# Patient Record
Sex: MALE | Race: BLACK OR AFRICAN AMERICAN | URBAN - METROPOLITAN AREA
[De-identification: names, ages, dates, MRNs, and addresses within clinical notes are randomized per-mention and may not be internally consistent; named-entity substitution may affect disease eponyms.]

---

## 2019-04-30 ENCOUNTER — OFFICE VISIT (OUTPATIENT)
Dept: FAMILY MEDICINE CLINIC | Age: 69
End: 2019-04-30

## 2019-04-30 ENCOUNTER — HOSPITAL ENCOUNTER (OUTPATIENT)
Dept: LAB | Age: 69
Discharge: HOME | End: 2019-04-30
Payer: COMMERCIAL

## 2019-04-30 VITALS
HEART RATE: 60 BPM | DIASTOLIC BLOOD PRESSURE: 80 MMHG | HEIGHT: 70 IN | OXYGEN SATURATION: 99 % | RESPIRATION RATE: 16 BRPM | SYSTOLIC BLOOD PRESSURE: 120 MMHG | BODY MASS INDEX: 29.35 KG/M2 | WEIGHT: 205 LBS | TEMPERATURE: 98.7 F

## 2019-04-30 DIAGNOSIS — I10 ESSENTIAL HYPERTENSION: Primary | ICD-10-CM

## 2019-04-30 DIAGNOSIS — Z12.5 PROSTATE CANCER SCREENING: ICD-10-CM

## 2019-04-30 DIAGNOSIS — E55.9 HYPOVITAMINOSIS D: ICD-10-CM

## 2019-04-30 DIAGNOSIS — E78.49 OTHER HYPERLIPIDEMIA: ICD-10-CM

## 2019-04-30 DIAGNOSIS — K42.9 UMBILICAL HERNIA WITHOUT OBSTRUCTION AND WITHOUT GANGRENE: ICD-10-CM

## 2019-04-30 DIAGNOSIS — R20.2 PARESTHESIA OF RIGHT ARM: ICD-10-CM

## 2019-04-30 LAB
ALBUMIN SERPL-MCNC: 4.5 G/DL (ref 3.4–5)
ALBUMIN/GLOB SERPL: 1.4 {RATIO} (ref 0.8–1.7)
ALP SERPL-CCNC: 52 U/L (ref 45–117)
ALT SERPL-CCNC: 31 U/L (ref 16–61)
ANION GAP SERPL CALC-SCNC: 5 MMOL/L (ref 3–18)
APPEARANCE UR: CLEAR
AST SERPL-CCNC: 22 U/L (ref 15–37)
BASOPHILS # BLD: 0.1 K/UL (ref 0–0.1)
BASOPHILS NFR BLD: 1 % (ref 0–2)
BILIRUB DIRECT SERPL-MCNC: 0.2 MG/DL (ref 0–0.2)
BILIRUB SERPL-MCNC: 0.6 MG/DL (ref 0.2–1)
BILIRUB UR QL: NEGATIVE
BUN SERPL-MCNC: 12 MG/DL (ref 7–18)
BUN/CREAT SERPL: 10 (ref 12–20)
CALCIUM SERPL-MCNC: 9.1 MG/DL (ref 8.5–10.1)
CHLORIDE SERPL-SCNC: 107 MMOL/L (ref 100–108)
CHOLEST SERPL-MCNC: 146 MG/DL
CO2 SERPL-SCNC: 29 MMOL/L (ref 21–32)
COLOR UR: YELLOW
CREAT SERPL-MCNC: 1.21 MG/DL (ref 0.6–1.3)
DIFFERENTIAL METHOD BLD: ABNORMAL
EOSINOPHIL # BLD: 0.1 K/UL (ref 0–0.4)
EOSINOPHIL NFR BLD: 2 % (ref 0–5)
ERYTHROCYTE [DISTWIDTH] IN BLOOD BY AUTOMATED COUNT: 14.8 % (ref 11.6–14.5)
GLOBULIN SER CALC-MCNC: 3.2 G/DL (ref 2–4)
GLUCOSE SERPL-MCNC: 84 MG/DL (ref 74–99)
GLUCOSE UR STRIP.AUTO-MCNC: NEGATIVE MG/DL
HCT VFR BLD AUTO: 44.5 % (ref 36–48)
HDLC SERPL-MCNC: 51 MG/DL (ref 40–60)
HDLC SERPL: 2.9 {RATIO} (ref 0–5)
HGB BLD-MCNC: 14.2 G/DL (ref 13–16)
HGB UR QL STRIP: NEGATIVE
KETONES UR QL STRIP.AUTO: NEGATIVE MG/DL
LDLC SERPL CALC-MCNC: 76.4 MG/DL (ref 0–100)
LEUKOCYTE ESTERASE UR QL STRIP.AUTO: NEGATIVE
LIPID PROFILE,FLP: NORMAL
LYMPHOCYTES # BLD: 1.7 K/UL (ref 0.9–3.6)
LYMPHOCYTES NFR BLD: 31 % (ref 21–52)
MCH RBC QN AUTO: 28.6 PG (ref 24–34)
MCHC RBC AUTO-ENTMCNC: 31.9 G/DL (ref 31–37)
MCV RBC AUTO: 89.7 FL (ref 74–97)
MONOCYTES # BLD: 0.5 K/UL (ref 0.05–1.2)
MONOCYTES NFR BLD: 10 % (ref 3–10)
NEUTS SEG # BLD: 3 K/UL (ref 1.8–8)
NEUTS SEG NFR BLD: 56 % (ref 40–73)
NITRITE UR QL STRIP.AUTO: NEGATIVE
PH UR STRIP: 5.5 [PH] (ref 5–8)
PLATELET # BLD AUTO: 342 K/UL (ref 135–420)
PMV BLD AUTO: 10.2 FL (ref 9.2–11.8)
POTASSIUM SERPL-SCNC: 4.4 MMOL/L (ref 3.5–5.5)
PROT SERPL-MCNC: 7.7 G/DL (ref 6.4–8.2)
PROT UR STRIP-MCNC: NEGATIVE MG/DL
PSA SERPL-MCNC: 0 NG/ML (ref 0–4)
RBC # BLD AUTO: 4.96 M/UL (ref 4.7–5.5)
SODIUM SERPL-SCNC: 141 MMOL/L (ref 136–145)
SP GR UR REFRACTOMETRY: 1.02 (ref 1–1.03)
T4 FREE SERPL-MCNC: 0.9 NG/DL (ref 0.7–1.5)
TRIGL SERPL-MCNC: 93 MG/DL (ref ?–150)
TSH SERPL DL<=0.05 MIU/L-ACNC: 1.43 UIU/ML (ref 0.36–3.74)
UROBILINOGEN UR QL STRIP.AUTO: 0.2 EU/DL (ref 0.2–1)
VLDLC SERPL CALC-MCNC: 18.6 MG/DL
WBC # BLD AUTO: 5.3 K/UL (ref 4.6–13.2)

## 2019-04-30 PROCEDURE — 80048 BASIC METABOLIC PNL TOTAL CA: CPT

## 2019-04-30 PROCEDURE — 36415 COLL VENOUS BLD VENIPUNCTURE: CPT

## 2019-04-30 PROCEDURE — 80076 HEPATIC FUNCTION PANEL: CPT

## 2019-04-30 PROCEDURE — 84443 ASSAY THYROID STIM HORMONE: CPT

## 2019-04-30 PROCEDURE — 81003 URINALYSIS AUTO W/O SCOPE: CPT

## 2019-04-30 PROCEDURE — 82306 VITAMIN D 25 HYDROXY: CPT

## 2019-04-30 PROCEDURE — 80061 LIPID PANEL: CPT

## 2019-04-30 PROCEDURE — 85025 COMPLETE CBC W/AUTO DIFF WBC: CPT

## 2019-04-30 PROCEDURE — 84153 ASSAY OF PSA TOTAL: CPT

## 2019-04-30 PROCEDURE — 84439 ASSAY OF FREE THYROXINE: CPT

## 2019-04-30 RX ORDER — ATORVASTATIN CALCIUM 20 MG/1
TABLET, FILM COATED ORAL DAILY
COMMUNITY
End: 2019-04-30 | Stop reason: SDUPTHER

## 2019-04-30 RX ORDER — LOSARTAN POTASSIUM 50 MG/1
TABLET ORAL DAILY
COMMUNITY
End: 2019-04-30 | Stop reason: SDUPTHER

## 2019-04-30 RX ORDER — AMLODIPINE BESYLATE 10 MG/1
10 TABLET ORAL DAILY
Qty: 90 TAB | Refills: 1 | Status: SHIPPED | OUTPATIENT
Start: 2019-04-30 | End: 2020-02-21 | Stop reason: SDUPTHER

## 2019-04-30 RX ORDER — LOSARTAN POTASSIUM 100 MG/1
100 TABLET ORAL DAILY
Qty: 90 TAB | Refills: 1 | Status: SHIPPED | OUTPATIENT
Start: 2019-04-30 | End: 2019-10-19 | Stop reason: SDUPTHER

## 2019-04-30 RX ORDER — ATORVASTATIN CALCIUM 20 MG/1
20 TABLET, FILM COATED ORAL DAILY
Qty: 90 TAB | Refills: 1 | Status: SHIPPED | OUTPATIENT
Start: 2019-04-30 | End: 2019-10-19 | Stop reason: SDUPTHER

## 2019-04-30 RX ORDER — AMLODIPINE BESYLATE 10 MG/1
20 TABLET ORAL DAILY
COMMUNITY
End: 2019-04-30 | Stop reason: SDUPTHER

## 2019-04-30 NOTE — PROGRESS NOTES
Annamarie Lieberman is a 71 y.o. male (: 1950) presenting to address: Chief Complaint Patient presents with  Complete Physical  
 
 
Vitals:  
 19 0845 BP: 120/80 Pulse: (!) 58 Resp: 16 Temp: 98.7 °F (37.1 °C) TempSrc: Oral  
SpO2: 99% Weight: 205 lb (93 kg) Height: 5' 10\" (1.778 m) PainSc:   0 - No pain Hearing/Vision: No exam data present Learning Assessment:  
 
Learning Assessment 2019 PRIMARY LEARNER Patient HIGHEST LEVEL OF EDUCATION - PRIMARY LEARNER  > 4 YEARS OF COLLEGE  
BARRIERS PRIMARY LEARNER NONE PRIMARY LANGUAGE ENGLISH  
LEARNER PREFERENCE PRIMARY LISTENING  
ANSWERED BY patient RELATIONSHIP SELF Depression Screening:  
 
3 most recent PHQ Screens 2019 Little interest or pleasure in doing things Not at all Feeling down, depressed, irritable, or hopeless Not at all Total Score PHQ 2 0 Fall Risk Assessment:  
 
Fall Risk Assessment, last 12 mths 2019 Able to walk? Yes Fall in past 12 months? No  
 
Abuse Screening:  
 
Abuse Screening Questionnaire 2019 Do you ever feel afraid of your partner? Nereida Pack Are you in a relationship with someone who physically or mentally threatens you? Nereida Pack Is it safe for you to go home? Caryn Pitts Coordination of Care Questionaire: 1. Have you been to the ER, urgent care clinic since your last visit? Hospitalized since your last visit? NO 
 
2. Have you seen or consulted any other health care providers outside of the [address] [place] [address] since your last visit? Include any pap smears or colon screening. NO Advanced Directive: 1. Do you have an Advanced Directive? Yes  
 
2. Would you like information on Advanced Directives?  NO

## 2019-04-30 NOTE — PATIENT INSTRUCTIONS
DASH Diet: Care Instructions Your Care Instructions The DASH diet is an eating plan that can help lower your blood pressure. DASH stands for Dietary Approaches to Stop Hypertension. Hypertension is high blood pressure. The DASH diet focuses on eating foods that are high in calcium, potassium, and magnesium. These nutrients can lower blood pressure. The foods that are highest in these nutrients are fruits, vegetables, low-fat dairy products, nuts, seeds, and legumes. But taking calcium, potassium, and magnesium supplements instead of eating foods that are high in those nutrients does not have the same effect. The DASH diet also includes whole grains, fish, and poultry. The DASH diet is one of several lifestyle changes your doctor may recommend to lower your high blood pressure. Your doctor may also want you to decrease the amount of sodium in your diet. Lowering sodium while following the DASH diet can lower blood pressure even further than just the DASH diet alone. Follow-up care is a key part of your treatment and safety. Be sure to make and go to all appointments, and call your doctor if you are having problems. It's also a good idea to know your test results and keep a list of the medicines you take. How can you care for yourself at home? Following the DASH diet · Eat 4 to 5 servings of fruit each day. A serving is 1 medium-sized piece of fruit, ½ cup chopped or canned fruit, 1/4 cup dried fruit, or 4 ounces (½ cup) of fruit juice. Choose fruit more often than fruit juice. · Eat 4 to 5 servings of vegetables each day. A serving is 1 cup of lettuce or raw leafy vegetables, ½ cup of chopped or cooked vegetables, or 4 ounces (½ cup) of vegetable juice. Choose vegetables more often than vegetable juice. · Get 2 to 3 servings of low-fat and fat-free dairy each day. A serving is 8 ounces of milk, 1 cup of yogurt, or 1 ½ ounces of cheese. · Eat 6 to 8 servings of grains each day. A serving is 1 slice of bread, 1 ounce of dry cereal, or ½ cup of cooked rice, pasta, or cooked cereal. Try to choose whole-grain products as much as possible. · Limit lean meat, poultry, and fish to 2 servings each day. A serving is 3 ounces, about the size of a deck of cards. · Eat 4 to 5 servings of nuts, seeds, and legumes (cooked dried beans, lentils, and split peas) each week. A serving is 1/3 cup of nuts, 2 tablespoons of seeds, or ½ cup of cooked beans or peas. · Limit fats and oils to 2 to 3 servings each day. A serving is 1 teaspoon of vegetable oil or 2 tablespoons of salad dressing. · Limit sweets and added sugars to 5 servings or less a week. A serving is 1 tablespoon jelly or jam, ½ cup sorbet, or 1 cup of lemonade. · Eat less than 2,300 milligrams (mg) of sodium a day. If you limit your sodium to 1,500 mg a day, you can lower your blood pressure even more. Tips for success · Start small. Do not try to make dramatic changes to your diet all at once. You might feel that you are missing out on your favorite foods and then be more likely to not follow the plan. Make small changes, and stick with them. Once those changes become habit, add a few more changes. · Try some of the following: ? Make it a goal to eat a fruit or vegetable at every meal and at snacks. This will make it easy to get the recommended amount of fruits and vegetables each day. ? Try yogurt topped with fruit and nuts for a snack or healthy dessert. ? Add lettuce, tomato, cucumber, and onion to sandwiches. ? Combine a ready-made pizza crust with low-fat mozzarella cheese and lots of vegetable toppings. Try using tomatoes, squash, spinach, broccoli, carrots, cauliflower, and onions. ? Have a variety of cut-up vegetables with a low-fat dip as an appetizer instead of chips and dip. ? Sprinkle sunflower seeds or chopped almonds over salads.  Or try adding chopped walnuts or almonds to cooked vegetables. ? Try some vegetarian meals using beans and peas. Add garbanzo or kidney beans to salads. Make burritos and tacos with mashed calvillo beans or black beans. Where can you learn more? Go to http://kris-nito.info/. Enter H564 in the search box to learn more about \"DASH Diet: Care Instructions. \" Current as of: July 22, 2018 Content Version: 11.9 © 4448-5270 [email or web address]. Care instructions adapted under license by [employer] (which disclaims liability or warranty for this information). If you have questions about a medical condition or this instruction, always ask your healthcare professional. [address] any warranty or liability for your use of this information.

## 2019-04-30 NOTE — PROGRESS NOTES
Assessment/Plan: *Diagnoses and all orders for this visit: 1. Essential hypertension 
-     amLODIPine (NORVASC) 10 mg tablet; Take 1 Tab by mouth daily. -     losartan (COZAAR) 100 mg tablet; Take 1 Tab by mouth daily. 2. Other hyperlipidemia 
-     CBC WITH AUTOMATED DIFF; Future 
-     HEPATIC FUNCTION PANEL; Future -     LIPID PANEL; Future -     METABOLIC PANEL, BASIC; Future 
-     TSH 3RD GENERATION; Future -     T4, FREE; Future -     URINALYSIS W/ RFLX MICROSCOPIC; Future 
-     atorvastatin (LIPITOR) 20 mg tablet; Take 1 Tab by mouth daily. 3. Prostate cancer screening -     PSA SCREENING (SCREENING); Future 4. Hypovitaminosis D 
-     VITAMIN D, 25 HYDROXY; Future 5. Umbilical hernia without obstruction and without gangrene 
-     REFERRAL TO GENERAL SURGERY 6. Paresthesia of right arm Will try Aleve 1 tab BID for 10 - 14 days and monitor arm. If not better, will send him for a neck xray and steroids. Did not want to try this yet. Return for [address] in 3-4 weeks. We do labs today. Will be due for  next time. The plan was discussed with the patient. The patient verbalized understanding and is in agreement with the plan. All medication potential side effects were discussed with the patient. 
 
------------------------------------------------------------------------------------------------------------------- Myron Capps is a 71 y.o. male and presents with Complete Physical; Tingling (x 1 month right arm ); and Hernia (Non Specific) Subjective: 
Pt is here to establish as a new PCP. He is retired. Is having issues with a new hernia. Had a prior abd hernia which was repaired in 2015, at the same time that he had partial colectomy for colon cancer. Denies any pain in abdomen. Has also been monitoring a tingling sensation in the RT arm that he has had a few weeks.   No known hx of any trauma, no neck pain associated with this, no arm pain. Has been working out at [employer] more now, using weights. HTN:  Well controlled, stable. But his prior PCP in [facility] was giving him Amlodipine as 20 mg! Not as per the guidelines, not even an off label recommendation for HTN. HLD:  On Lipitor 20 mg. Had labs about 6 mon ago, as per pt. No records available at this time. ROS: 
Review of Systems - Negative except as above The problem list was updated as a part of today's visit. There is no problem list on file for this patient. The PSH, FH were reviewed. SH: Social History Tobacco Use  Smoking status: Never Smoker  Smokeless tobacco: Never Used Substance Use Topics  Alcohol use: Not Currently  Drug use: Never Medications/Allergies: No current outpatient medications on file prior to visit. No current facility-administered medications on file prior to visit. No Known Allergies Health Maintenance:  
Health Maintenance Topic Date Due  
 Hepatitis C Screening  1950  DTaP/Tdap/Td series (1 - Tdap) 01/24/1971  Shingrix Vaccine Age 50> (1 of 2) 01/24/2000  
 FOBT Q 1 YEAR AGE 50-75  01/24/2000  GLAUCOMA SCREENING Q2Y  01/24/2015  Pneumococcal 65+ years (1 of 2 - PCV13) 01/24/2015  Influenza Age 5 to Adult  08/01/2019 Objective: 
Visit Vitals /80 (BP 1 Location: Left arm, BP Patient Position: Sitting) Pulse 60 Temp 98.7 °F (37.1 °C) (Oral) Resp 16 Ht 5' 10\" (1.778 m) Wt 205 lb (93 kg) SpO2 99% BMI 29.41 kg/m² Nurses notes and VS reviewed. Physical Examination: General appearance - alert, well appearing, and in no distress Chest - clear to auscultation, no wheezes, rales or rhonchi, symmetric air entry Heart - normal rate, regular rhythm, normal S1, S2, no murmurs, rubs, clicks or gallops Abdomen - HERNIA EXAM: umbilical hernia, reducible, nontender Labwork and Ancillary Studies:  CBC w/Diff 
 No results found for: WBC, WBCLT, HGB, HGBP, PLT, HGBEXT, PLTEXT, HGBEXT, PLTEXT Basic Metabolic Profile No results found for: NA, K, CL, CO2, AGAP, GLU, BUN, CREA, BUCR, GFRAA, GFRNA, CA, GFRAA  
  
LFT No results found for: GPT, ALT, SGOT, GGT, GGTP, AP, APIT, APX, CBIL, TBIL, TBILI Cholesterol No results found for: CHOL, CHOLX, CHLST, CHOLV, HDL, LDL, LDLC, DLDLP, TGLX, TRIGL, TRIGP, CHHD, CHHDX

## 2019-05-01 LAB — 25(OH)D3 SERPL-MCNC: 26.8 NG/ML (ref 30–100)

## 2019-05-02 ENCOUNTER — TELEPHONE (OUTPATIENT)
Dept: SURGERY | Age: 69
End: 2019-05-02

## 2019-05-15 ENCOUNTER — OFFICE VISIT (OUTPATIENT)
Dept: SURGERY | Age: 69
End: 2019-05-15

## 2019-05-15 VITALS
BODY MASS INDEX: 29.2 KG/M2 | TEMPERATURE: 95.6 F | WEIGHT: 204 LBS | HEART RATE: 60 BPM | HEIGHT: 70 IN | DIASTOLIC BLOOD PRESSURE: 90 MMHG | SYSTOLIC BLOOD PRESSURE: 139 MMHG | OXYGEN SATURATION: 100 %

## 2019-05-15 DIAGNOSIS — K43.2 INCISIONAL HERNIA, WITHOUT OBSTRUCTION OR GANGRENE: Primary | ICD-10-CM

## 2019-05-15 NOTE — PROGRESS NOTES
General Surgery Consult Edith Paul  Admit date: (Not on file) MRN: [id number]     : 1950     Age: 71 y.o. Attending Physician: Nicolette Arroyo MD [facility] History of Present Illness:  
 
Edith Paul is a 71 y.o. male was referred for evaluation of a large incisional hernia. The patient has stated that he had an umbilical hernia for decades. In  he was diagnosed with colon cancer for which he had a laparoscopic colectomy with the extraction site at the site of the umbilicus. It seems that this made the hernia larger and he stated that they try to close the umbilical hernia during the colectomy but he developed a an incisional hernia. And then the patient developed prostate cancer and he had a robotic prostatectomy with also the extracting site from the site of the hernia which made the hernia even larger. He stating that now the hernia has been large for the last 4 to 5 years. He said that it is protruding most of the time though sometimes when he lay back can go back into the abdomen. He denies any pain or any discomfort or any change in bowel habits. He also denies constipation. The patient has stated that he was not interested in surgery however he said that his wife and daughter are pushing him to have the surgery because of the way the hernia is looking. Today he is here with his wife. There are no active problems to display for this patient. Past Medical History:  
Diagnosis Date  High cholesterol  Hypertension Past Surgical History:  
Procedure Laterality Date  HX COLECTOMY  2015  
 colon cancer  HX HERNIA REPAIR  2015  HX PROSTATECTOMY  2012 Social History Tobacco Use  Smoking status: Never Smoker  Smokeless tobacco: Never Used Substance Use Topics  Alcohol use: Not Currently Social History Tobacco Use Smoking Status Never Smoker Smokeless Tobacco Never Used Family History Problem Relation Age of Onset  Arthritis Mother  Hypertension Father  Alcohol abuse Father  Hypertension Maternal Grandfather  Alcohol abuse Brother  Drug Abuse Brother  No Known Problems Maternal Grandmother  No Known Problems Paternal Grandmother  No Known Problems Paternal Grandfather Current Outpatient Medications Medication Sig  
 amLODIPine (NORVASC) 10 mg tablet Take 1 Tab by mouth daily.  atorvastatin (LIPITOR) 20 mg tablet Take 1 Tab by mouth daily.  losartan (COZAAR) 100 mg tablet Take 1 Tab by mouth daily. No current facility-administered medications for this visit. No Known Allergies Review of Systems: 
Constitutional: negative Eyes: negative Ears, Nose, Mouth, Throat, and Face: negative Respiratory: negative Cardiovascular: negative Gastrointestinal: positive for Abdominal wall bulge Genitourinary:negative Integument/Breast: negative Hematologic/Lymphatic: negative Musculoskeletal:negative Neurological: negative Behavioral/Psychiatric: negative Endocrine: negative Allergic/Immunologic: negative Objective:  
 
Visit Vitals /90 (BP Patient Position: Sitting) Pulse 60 Temp 95.6 °F (35.3 °C) (Oral) Ht 5' 10\" (1.778 m) Wt 204 lb (92.5 kg) SpO2 100% BMI 29.27 kg/m² Physical Exam:   
 
General:  in no apparent distress, alert, oriented times 3 and cooperative Eyes:  conjunctivae and sclerae normal, pupils equal, round, reactive to light Throat & Neck: no erythema or exudates noted and neck supple and symmetrical; no palpable masses Lungs:   clear to auscultation bilaterally Heart:  Regular rate and rhythm Abdomen:   rounded, soft, nontender, nondistended, no masses or organomegaly. There is a very large abdominal wall hernia located in the supraumbilical area at the site of a transverse incision. the hernia sac is more than 50 cm in size but the defect is probably about 5 cm . Extremities: extremities normal, atraumatic, no cyanosis or edema Skin: Normal.  
   
Imaging and Lab Review: CBC:  
Lab Results Component Value Date/Time WBC 5.3 04/30/2019 10:05 AM  
 RBC 4.96 04/30/2019 10:05 AM  
 HGB 14.2 04/30/2019 10:05 AM  
 HCT 44.5 04/30/2019 10:05 AM  
 PLATELET 250 55/20/6171 10:05 AM  
 
BMP:  
Lab Results Component Value Date/Time Glucose 84 04/30/2019 10:05 AM  
 Sodium 141 04/30/2019 10:05 AM  
 Potassium 4.4 04/30/2019 10:05 AM  
 Chloride 107 04/30/2019 10:05 AM  
 CO2 29 04/30/2019 10:05 AM  
 BUN 12 04/30/2019 10:05 AM  
 Creatinine 1.21 04/30/2019 10:05 AM  
 Calcium 9.1 04/30/2019 10:05 AM  
 
CMP: 
Lab Results Component Value Date/Time Glucose 84 04/30/2019 10:05 AM  
 Sodium 141 04/30/2019 10:05 AM  
 Potassium 4.4 04/30/2019 10:05 AM  
 Chloride 107 04/30/2019 10:05 AM  
 CO2 29 04/30/2019 10:05 AM  
 BUN 12 04/30/2019 10:05 AM  
 Creatinine 1.21 04/30/2019 10:05 AM  
 Calcium 9.1 04/30/2019 10:05 AM  
 Anion gap 5 04/30/2019 10:05 AM  
 BUN/Creatinine ratio 10 (L) 04/30/2019 10:05 AM  
 Alk. phosphatase 52 04/30/2019 10:05 AM  
 Protein, total 7.7 04/30/2019 10:05 AM  
 Albumin 4.5 04/30/2019 10:05 AM  
 Globulin 3.2 04/30/2019 10:05 AM  
 A-G Ratio 1.4 04/30/2019 10:05 AM  
 
 
No results found for this or any previous visit (from the past 24 hour(s)). images and reports reviewed Assessment:  
Wade Reynolds is a 71 y.o. male is presenting with a picture of large recurrent incisional hernia. I had a long discussion with the patient and his wife about the risk of surgery including the high risk of recurrence. I also discussed with them the risk of not doing the surgery including the possibility of incarceration, strangulation, enlargement in size over time, and the risk of emergency surgery in the face of strangulation.  I also discussed the use of prosthetic materials (mesh), including the risk of infection. Also discussed the risk of surgery including recurrence and the possible need for reoperation and removal of mesh if used, possibility of postoperative small bowel injury, obstruction or ileus, and the risks of general anesthetic. I explained to the the patient about the robotic hernia repair procedure. After long discussion and thinking the patient decided that he would like to wait on the surgery and follow-up with me in 6 months to 1 year. Plan:  
  
Close observation Follow-up with me as needed Please call me if you have any questions (cell phone: [phone]) Signed By: Bandar Davison MD   
 May 15, 2019

## 2019-05-15 NOTE — PROGRESS NOTES
Edith Paul is a 71 y.o. male (: 1950) presenting to address: Chief Complaint Patient presents with  Umbilical Hernia  
  present approx 5 years; previous repair Medication list and allergies have been reviewed with Edith Paul and updated as of today's date. I have gone over all Medical, Surgical and Social History with Edith Paul and updated/added the information accordingly. Patient reports to clinics with a very visible umbilical hernia that he reports has been present approximately 5 years. He informs me he has a hx with cancer of the prostate and of th colon which was treated at [facility] with Dr. Eduar Trevizo. He denies and N&V, constipation, diarrhea, body ache, fever, or chills. Pt informs me that he has a previous colon resection and umbilical hernia repair in [place], [place] which we have obtained an [facility] for records. Visit Vitals /90 (BP Patient Position: Sitting) Pulse 60 Temp 95.6 °F (35.3 °C) (Oral) Ht 5' 10\" (1.778 m) Wt 92.5 kg (204 lb) SpO2 100% BMI 29.27 kg/m²

## 2019-05-20 ENCOUNTER — DOCUMENTATION ONLY (OUTPATIENT)
Dept: FAMILY MEDICINE CLINIC | Age: 69
End: 2019-05-20

## 2019-05-20 NOTE — PROGRESS NOTES
Called and left message for patient to call office . I was just following up on gastro info  . When patient was seen he couldn't recall who did his colonoscopy in [place] .

## 2019-06-13 ENCOUNTER — OFFICE VISIT (OUTPATIENT)
Dept: FAMILY MEDICINE CLINIC | Age: 69
End: 2019-06-13

## 2019-06-13 VITALS
HEART RATE: 63 BPM | SYSTOLIC BLOOD PRESSURE: 134 MMHG | DIASTOLIC BLOOD PRESSURE: 94 MMHG | OXYGEN SATURATION: 98 % | TEMPERATURE: 98.5 F | WEIGHT: 201 LBS | RESPIRATION RATE: 16 BRPM | BODY MASS INDEX: 28.77 KG/M2 | HEIGHT: 70 IN

## 2019-06-13 DIAGNOSIS — Z00.00 MEDICARE ANNUAL WELLNESS VISIT, SUBSEQUENT: ICD-10-CM

## 2019-06-13 DIAGNOSIS — Z85.038 HISTORY OF COLON CANCER: ICD-10-CM

## 2019-06-13 DIAGNOSIS — Z90.49 HISTORY OF COLECTOMY: ICD-10-CM

## 2019-06-13 DIAGNOSIS — I10 ESSENTIAL HYPERTENSION: Primary | ICD-10-CM

## 2019-06-13 DIAGNOSIS — E78.49 OTHER HYPERLIPIDEMIA: ICD-10-CM

## 2019-06-13 RX ORDER — PILOCARPINE HYDROCHLORIDE 10 MG/ML
1 SOLUTION/ DROPS OPHTHALMIC 2 TIMES DAILY
Refills: 3 | COMMUNITY
Start: 2019-04-04

## 2019-06-13 RX ORDER — LATANOPROST 50 UG/ML
0.01 SOLUTION/ DROPS OPHTHALMIC DAILY
Refills: 3 | COMMUNITY
Start: 2019-05-15

## 2019-06-13 RX ORDER — CHLORTHALIDONE 25 MG/1
25 TABLET ORAL DAILY
Qty: 90 TAB | Refills: 1 | Status: SHIPPED | OUTPATIENT
Start: 2019-06-13 | End: 2019-12-01 | Stop reason: SDUPTHER

## 2019-06-13 RX ORDER — ERGOCALCIFEROL 1.25 MG/1
1000 CAPSULE ORAL DAILY
COMMUNITY
End: 2019-06-13 | Stop reason: CLARIF

## 2019-06-13 RX ORDER — GLUCOSAMINE SULFATE 1500 MG
POWDER IN PACKET (EA) ORAL DAILY
COMMUNITY

## 2019-06-13 NOTE — PROGRESS NOTES
This is the Subsequent Medicare Annual Wellness Exam, performed 12 months or more after the Initial AWV or the last Subsequent AWV    I have reviewed the patient's medical history in detail and updated the computerized patient record. History     Past Medical History:   Diagnosis Date    High cholesterol     Hypertension       Past Surgical History:   Procedure Laterality Date    HX COLECTOMY  2015    colon cancer     HX HERNIA REPAIR  2015    HX PROSTATECTOMY  2012     Current Outpatient Medications   Medication Sig Dispense Refill    latanoprost (XALATAN) 0.005 % ophthalmic solution Administer 0.005 Drops to both eyes daily. 3    pilocarpine (PILOCAR) 1 % ophthalmic solution Apply 1 Drop to eye two (2) times a day. 3    cholecalciferol (VITAMIN D3) 1,000 unit cap Take  by mouth daily.  chlorthalidone (HYGROTEN) 25 mg tablet Take 1 Tab by mouth daily. 90 Tab 1    amLODIPine (NORVASC) 10 mg tablet Take 1 Tab by mouth daily. 90 Tab 1    atorvastatin (LIPITOR) 20 mg tablet Take 1 Tab by mouth daily. 90 Tab 1    losartan (COZAAR) 100 mg tablet Take 1 Tab by mouth daily. 80 Tab 1     No Known Allergies  Family History   Problem Relation Age of Onset    Arthritis Mother     Hypertension Father     Alcohol abuse Father     Hypertension Maternal Grandfather     Alcohol abuse Brother     Drug Abuse Brother     No Known Problems Maternal Grandmother     No Known Problems Paternal Grandmother     No Known Problems Paternal Grandfather      Social History     Tobacco Use    Smoking status: Never Smoker    Smokeless tobacco: Never Used   Substance Use Topics    Alcohol use: Not Currently     There is no problem list on file for this patient.       Depression Risk Factor Screening:     3 most recent PHQ Screens 6/13/2019   Little interest or pleasure in doing things Not at all   Feeling down, depressed, irritable, or hopeless Not at all   Total Score PHQ 2 0     Alcohol Risk Factor Screening: You do not drink alcohol or very rarely. Functional Ability and Level of Safety:   Hearing Loss  Hearing is good. Activities of Daily Living  The home contains: no safety equipment. Patient does total self care    Fall Risk  Fall Risk Assessment, last 12 mths 6/13/2019   Able to walk? Yes   Fall in past 12 months? Yes       Abuse Screen  Patient is not abused    Cognitive Screening   Evaluation of Cognitive Function:  Has your family/caregiver stated any concerns about your memory: no  Normal    Patient Care Team   Patient Care Team:  Norris Rice MD as PCP - General (Internal Medicine)    Assessment/Plan   Education and counseling provided:  Are appropriate based on today's review and evaluation    Diagnoses and all orders for this visit:    1. Medicare annual wellness visit, subsequent    2. History of colon cancer  -     REFERRAL TO GASTROENTEROLOGY    3. History of colectomy  -     REFERRAL TO GASTROENTEROLOGY    4. Essential hypertension    5. Other hyperlipidemia    Other orders  -     chlorthalidone (HYGROTEN) 25 mg tablet; Take 1 Tab by mouth daily.         Health Maintenance Due   Topic Date Due    Hepatitis C Screening  1950    Shingrix Vaccine Age 50> (1 of 2) 01/24/2000    FOBT Q 1 YEAR AGE 50-75  01/24/2000    GLAUCOMA SCREENING Q2Y  01/24/2015    Pneumococcal 65+ years (2 of 2 - PPSV23) 04/03/2018

## 2019-06-13 NOTE — PATIENT INSTRUCTIONS
Medicare Wellness Visit, Male The best way to live healthy is to have a lifestyle where you eat a well-balanced diet, exercise regularly, limit alcohol use, and quit all forms of tobacco/nicotine, if applicable. Regular preventive services are another way to keep healthy. Preventive services (vaccines, screening tests, monitoring & exams) can help personalize your care plan, which helps you manage your own care. Screening tests can find health problems at the earliest stages, when they are easiest to treat. [facility] follows the current, evidence-based guidelines published by the [facility] ([facility]) when recommending preventive services for our patients. Because we follow these guidelines, sometimes recommendations change over time as research supports it. (For example, a prostate screening blood test is no longer routinely recommended for men with no symptoms.) Of course, you and your doctor may decide to screen more often for some diseases, based on your risk and co-morbidities (chronic disease you are already diagnosed with). Preventive services for you include: - Medicare offers their members a free annual wellness visit, which is time for you and your primary care provider to discuss and plan for your preventive service needs. Take advantage of this benefit every year! 
-All adults over age 72 should receive the recommended pneumonia vaccines. Current USPSTF guidelines recommend a series of two vaccines for the best pneumonia protection.  
-All adults should have a flu vaccine yearly and an ECG.  All adults age 61 and older should receive a shingles vaccine once in their lifetime.   
-All adults age 38-68 who are overweight should have a diabetes screening test once every three years.  
-Other screening tests & preventive services for persons with diabetes include: an eye exam to screen for diabetic retinopathy, a kidney function test, a foot exam, and stricter control over your cholesterol.  
-Cardiovascular screening for adults with routine risk involves an electrocardiogram (ECG) at intervals determined by the provider.  
-Colorectal cancer screening should be done for adults age 54-65 with no increased risk factors for colorectal cancer. There are a number of acceptable methods of screening for this type of cancer. Each test has its own benefits and drawbacks. Discuss with your provider what is most appropriate for you during your annual wellness visit. The different tests include: colonoscopy (considered the best screening method), a fecal occult blood test, a fecal DNA test, and sigmoidoscopy. 
-All adults born between [facility] should be screened once for Hepatitis C. 
-An Abdominal Aortic Aneurysm (AAA) Screening is recommended for men age 73-68 who has ever smoked in their lifetime. Here is a list of your current Health Maintenance items (your personalized list of preventive services) with a due date: 
Health Maintenance Due Topic Date Due  
 Hepatitis C Test  1950  DTaP/Tdap/Td  (1 - Tdap) 01/24/1971  Shingles Vaccine (1 of 2) 01/24/2000  Stool testing for trace blood  01/24/2000  Glaucoma Screening   01/24/2015  Pneumococcal Vaccine (1 of 2 - PCV13) 01/24/2015

## 2019-06-16 NOTE — PROGRESS NOTES
Assessment/Plan:    *Diagnoses and all orders for this visit:    1. Essential hypertension    2. History of colon cancer  -     REFERRAL TO GASTROENTEROLOGY    3. History of colectomy  -     REFERRAL TO GASTROENTEROLOGY    4. Medicare annual wellness visit, subsequent    5. Other hyperlipidemia    Other orders  -     chlorthalidone (HYGROTEN) 25 mg tablet; Take 1 Tab by mouth daily. BP not at goal,will add chlorthalidone. Pt to f/u in 3-4 weeks. Will obtain colonoscopy report from prior. Due for repeat now as per pt. The plan was discussed with the patient. The patient verbalized understanding and is in agreement with the plan. All medication potential side effects were discussed with the patient.    -------------------------------------------------------------------------------------------------------------------        Kristian Powell is a 71 y.o. male and presents with Annual Wellness Visit         Subjective:  Pt here for f/u. HTN; we reduced his Norvasc from 20 mg (given by his prior MD) to 10 mg. Needs something more. HLD: well controlled on Lipitor. Vit d low. He has hx of colonic polyps in past, believes he is due for 5 yr repeat now. ROS:  Review of Systems - Negative except as above        The problem list was updated as a part of today's visit. There is no problem list on file for this patient. The PSH, FH were reviewed. SH:  Social History     Tobacco Use    Smoking status: Never Smoker    Smokeless tobacco: Never Used   Substance Use Topics    Alcohol use: Not Currently    Drug use: Never       Medications/Allergies:  Current Outpatient Medications on File Prior to Visit   Medication Sig Dispense Refill    latanoprost (XALATAN) 0.005 % ophthalmic solution Administer 0.005 Drops to both eyes daily. 3    pilocarpine (PILOCAR) 1 % ophthalmic solution Apply 1 Drop to eye two (2) times a day.   3    cholecalciferol (VITAMIN D3) 1,000 unit cap Take  by mouth daily.  amLODIPine (NORVASC) 10 mg tablet Take 1 Tab by mouth daily. 90 Tab 1    atorvastatin (LIPITOR) 20 mg tablet Take 1 Tab by mouth daily. 90 Tab 1    losartan (COZAAR) 100 mg tablet Take 1 Tab by mouth daily. 90 Tab 1     No current facility-administered medications on file prior to visit. No Known Allergies      Health Maintenance:   Health Maintenance   Topic Date Due    Hepatitis C Screening  1950    Shingrix Vaccine Age 50> (1 of 2) 01/24/2000    FOBT Q 1 YEAR AGE 50-75  01/24/2000    GLAUCOMA SCREENING Q2Y  01/24/2015    Pneumococcal 65+ years (2 of 2 - PPSV23) 04/03/2018    Influenza Age 5 to Adult  08/01/2019    DTaP/Tdap/Td series (2 - Td) 03/11/2021       Objective:  Visit Vitals  BP (!) 134/94 (BP 1 Location: Left arm, BP Patient Position: Sitting)   Pulse 63   Temp 98.5 °F (36.9 °C) (Oral)   Resp 16   Ht 5' 10\" (1.778 m)   Wt 201 lb (91.2 kg)   SpO2 98%   BMI 28.84 kg/m²          Nurses notes and VS reviewed.       Physical Examination: General appearance - alert, well appearing, and in no distress  Ears - bilateral TM's and external ear canals normal  Mouth - mucous membranes moist, pharynx normal without lesions  Neck - supple, no significant adenopathy  Chest - clear to auscultation, no wheezes, rales or rhonchi, symmetric air entry  Heart - normal rate and regular rhythm  Abdomen - soft, nontender, nondistended, no masses or organomegaly  Neurological - motor and sensory grossly normal bilaterally  Musculoskeletal - no joint tenderness, deformity or swelling  Extremities - peripheral pulses normal, no pedal edema, no clubbing or cyanosis          Labwork and Ancillary Studies:    CBC w/Diff  Lab Results   Component Value Date/Time    WBC 5.3 04/30/2019 10:05 AM    HGB 14.2 04/30/2019 10:05 AM    PLATELET 271 66/32/0057 10:05 AM         Basic Metabolic Profile  Lab Results   Component Value Date/Time    Sodium 141 04/30/2019 10:05 AM    Potassium 4.4 04/30/2019 10:05 AM    Chloride 107 04/30/2019 10:05 AM    CO2 29 04/30/2019 10:05 AM    Anion gap 5 04/30/2019 10:05 AM    Glucose 84 04/30/2019 10:05 AM    BUN 12 04/30/2019 10:05 AM    Creatinine 1.21 04/30/2019 10:05 AM    BUN/Creatinine ratio 10 (L) 04/30/2019 10:05 AM    GFR est AA >60 04/30/2019 10:05 AM    GFR est non-AA 59 (L) 04/30/2019 10:05 AM    Calcium 9.1 04/30/2019 10:05 AM         LFT  Lab Results   Component Value Date/Time    ALT (SGPT) 31 04/30/2019 10:05 AM    AST (SGOT) 22 04/30/2019 10:05 AM    Alk.  phosphatase 52 04/30/2019 10:05 AM    Bilirubin, direct 0.2 04/30/2019 10:05 AM    Bilirubin, total 0.6 04/30/2019 10:05 AM         Cholesterol  Lab Results   Component Value Date/Time    Cholesterol, total 146 04/30/2019 10:05 AM    HDL Cholesterol 51 04/30/2019 10:05 AM    LDL, calculated 76.4 04/30/2019 10:05 AM    Triglyceride 93 04/30/2019 10:05 AM    CHOL/HDL Ratio 2.9 04/30/2019 10:05 AM

## 2019-08-06 ENCOUNTER — OFFICE VISIT (OUTPATIENT)
Dept: FAMILY MEDICINE CLINIC | Age: 69
End: 2019-08-06

## 2019-08-06 VITALS
BODY MASS INDEX: 28.06 KG/M2 | RESPIRATION RATE: 16 BRPM | SYSTOLIC BLOOD PRESSURE: 118 MMHG | HEART RATE: 75 BPM | OXYGEN SATURATION: 96 % | TEMPERATURE: 97.9 F | HEIGHT: 70 IN | DIASTOLIC BLOOD PRESSURE: 82 MMHG | WEIGHT: 196 LBS

## 2019-08-06 DIAGNOSIS — I10 ESSENTIAL HYPERTENSION: Primary | ICD-10-CM

## 2019-08-06 DIAGNOSIS — E78.49 OTHER HYPERLIPIDEMIA: ICD-10-CM

## 2019-08-06 NOTE — PROGRESS NOTES
Steven Newton is a 71 y.o. male (: 1950) presenting to address:    Chief Complaint   Patient presents with    Hypertension       Vitals:    19 1053   BP: 118/82   Pulse: 75   Resp: 16   Temp: 97.9 °F (36.6 °C)   TempSrc: Oral   SpO2: 96%   Weight: 196 lb (88.9 kg)   Height: 5' 10\" (1.778 m)   PainSc:   0 - No pain       Hearing/Vision:   No exam data present    Learning Assessment:     Learning Assessment 2019   PRIMARY LEARNER Patient   HIGHEST LEVEL OF EDUCATION - PRIMARY LEARNER  > 4 YEARS OF COLLEGE   BARRIERS PRIMARY LEARNER NONE   PRIMARY LANGUAGE ENGLISH   LEARNER PREFERENCE PRIMARY LISTENING   ANSWERED BY patient    RELATIONSHIP SELF     Depression Screening:     3 most recent PHQ Screens 2019   Little interest or pleasure in doing things Not at all   Feeling down, depressed, irritable, or hopeless Not at all   Total Score PHQ 2 0     Fall Risk Assessment:     Fall Risk Assessment, last 12 mths 2019   Able to walk? Yes   Fall in past 12 months? Yes     Abuse Screening:     Abuse Screening Questionnaire 2019   Do you ever feel afraid of your partner? N   Are you in a relationship with someone who physically or mentally threatens you? N   Is it safe for you to go home? Y     Coordination of Care Questionaire:   1. Have you been to the ER, urgent care clinic since your last visit? Hospitalized since your last visit? NO    2. Have you seen or consulted any other health care providers outside of the [address] [place] [address] since your last visit? Include any pap smears or colon screening. NO    Advanced Directive:   1. Do you have an Advanced Directive? NO    2. Would you like information on Advanced Directives?  NO

## 2019-08-06 NOTE — PATIENT INSTRUCTIONS
High Blood Pressure: Care Instructions  Overview    It's normal for blood pressure to go up and down throughout the day. But if it stays up, you have high blood pressure. Another name for high blood pressure is hypertension. Despite what a lot of people think, high blood pressure usually doesn't cause headaches or make you feel dizzy or lightheaded. It usually has no symptoms. But it does increase your risk of stroke, heart attack, and other problems. You and your doctor will talk about your risks of these problems based on your blood pressure. Your doctor will give you a goal for your blood pressure. Your goal will be based on your health and your age. Lifestyle changes, such as eating healthy and being active, are always important to help lower blood pressure. You might also take medicine to reach your blood pressure goal.  Follow-up care is a key part of your treatment and safety. Be sure to make and go to all appointments, and call your doctor if you are having problems. It's also a good idea to know your test results and keep a list of the medicines you take. How can you care for yourself at home? Medical treatment  · If you stop taking your medicine, your blood pressure will go back up. You may take one or more types of medicine to lower your blood pressure. Be safe with medicines. Take your medicine exactly as prescribed. Call your doctor if you think you are having a problem with your medicine. · Talk to your doctor before you start taking aspirin every day. Aspirin can help certain people lower their risk of a heart attack or stroke. But taking aspirin isn't right for everyone, because it can cause serious bleeding. · See your doctor regularly. You may need to see the doctor more often at first or until your blood pressure comes down. · If you are taking blood pressure medicine, talk to your doctor before you take decongestants or anti-inflammatory medicine, such as ibuprofen.  Some of these medicines can raise blood pressure. · Learn how to check your blood pressure at home. Lifestyle changes  · Stay at a healthy weight. This is especially important if you put on weight around the waist. Losing even 10 pounds can help you lower your blood pressure. · If your doctor recommends it, get more exercise. Walking is a good choice. Bit by bit, increase the amount you walk every day. Try for at least 30 minutes on most days of the week. You also may want to swim, bike, or do other activities. · Avoid or limit alcohol. Talk to your doctor about whether you can drink any alcohol. · Try to limit how much sodium you eat to less than 2,300 milligrams (mg) a day. Your doctor may ask you to try to eat less than 1,500 mg a day. · Eat plenty of fruits (such as bananas and oranges), vegetables, legumes, whole grains, and low-fat dairy products. · Lower the amount of saturated fat in your diet. Saturated fat is found in animal products such as milk, cheese, and meat. Limiting these foods may help you lose weight and also lower your risk for heart disease. · Do not smoke. Smoking increases your risk for heart attack and stroke. If you need help quitting, talk to your doctor about stop-smoking programs and medicines. These can increase your chances of quitting for good. When should you call for help? Call 911 anytime you think you may need emergency care. This may mean having symptoms that suggest that your blood pressure is causing a serious heart or blood vessel problem. Your blood pressure may be over 180/120.   For example, call 911 if:    · You have symptoms of a heart attack. These may include:  ? Chest pain or pressure, or a strange feeling in the chest.  ? Sweating. ? Shortness of breath. ? Nausea or vomiting. ? Pain, pressure, or a strange feeling in the back, neck, jaw, or upper belly or in one or both shoulders or arms. ? Lightheadedness or sudden weakness.   ? A fast or irregular heartbeat.     · You have symptoms of a stroke. These may include:  ? Sudden numbness, tingling, weakness, or loss of movement in your face, arm, or leg, especially on only one side of your body. ? Sudden vision changes. ? Sudden trouble speaking. ? Sudden confusion or trouble understanding simple statements. ? Sudden problems with walking or balance. ? A sudden, severe headache that is different from past headaches.     · You have severe back or belly pain.    Do not wait until your blood pressure comes down on its own. Get help right away.   Call your doctor now or seek immediate care if:    · Your blood pressure is much higher than normal (such as 180/120 or higher), but you don't have symptoms.     · You think high blood pressure is causing symptoms, such as:  ? Severe headache.  ? Blurry vision.    Watch closely for changes in your health, and be sure to contact your doctor if:    · Your blood pressure measures higher than your doctor recommends at least 2 times. That means the top number is higher or the bottom number is higher, or both.     · You think you may be having side effects from your blood pressure medicine. Where can you learn more? Go to http://kris-nito.info/. Enter Z031 in the search box to learn more about \"High Blood Pressure: Care Instructions. \"  Current as of: July 22, 2018  Content Version: 12.1  © 2724-5920 [employer]. Care instructions adapted under license by [employer] (which disclaims liability or warranty for this information). If you have questions about a medical condition or this instruction, always ask your healthcare professional. [place] [address] any warranty or liability for your use of this information.

## 2019-08-06 NOTE — PROGRESS NOTES
Assessment/Plan:    *Diagnoses and all orders for this visit:    1. Essential hypertension    2. Other hyperlipidemia      Reviewed his BP log, discussed readings. Advised him to change the timing of meds. Will take chlorthalidone and Norvasc in AM,  Losartan in PM.    Will take meds in early AM then check BP about 9 am.  Will keep a log again. F/u in 4 months for HTN. I spent 15 minutes with patient today and > 50% of the visit was dedicated to discussing and counseling the patient on meds, side effects, schedule of when to take them. The plan was discussed with the patient. The patient verbalized understanding and is in agreement with the plan. All medication potential side effects were discussed with the patient.    -------------------------------------------------------------------------------------------------------------------        Gemma Ricci is a 71 y.o. male and presents with Hypertension         Subjective:  Pt here for f/u. HTN: seems to be doing better since we added chlorthalidone. HLD: at goal on BW. ROS:  Review of Systems - Negative         The problem list was updated as a part of today's visit. There is no problem list on file for this patient. The PSH, FH were reviewed. SH:  Social History     Tobacco Use    Smoking status: Never Smoker    Smokeless tobacco: Never Used   Substance Use Topics    Alcohol use: Not Currently    Drug use: Never       Medications/Allergies:  Current Outpatient Medications on File Prior to Visit   Medication Sig Dispense Refill    latanoprost (XALATAN) 0.005 % ophthalmic solution Administer 0.005 Drops to both eyes daily. 3    pilocarpine (PILOCAR) 1 % ophthalmic solution Apply 1 Drop to eye two (2) times a day. 3    cholecalciferol (VITAMIN D3) 1,000 unit cap Take  by mouth daily.  chlorthalidone (HYGROTEN) 25 mg tablet Take 1 Tab by mouth daily.  90 Tab 1    amLODIPine (NORVASC) 10 mg tablet Take 1 Tab by mouth daily. 90 Tab 1    atorvastatin (LIPITOR) 20 mg tablet Take 1 Tab by mouth daily. 90 Tab 1    losartan (COZAAR) 100 mg tablet Take 1 Tab by mouth daily. 90 Tab 1     No current facility-administered medications on file prior to visit. No Known Allergies      Health Maintenance:   Health Maintenance   Topic Date Due    Hepatitis C Screening  1950    Shingrix Vaccine Age 50> (1 of 2) 01/24/2000    FOBT Q 1 YEAR AGE 50-75  01/24/2000    GLAUCOMA SCREENING Q2Y  01/24/2015    Pneumococcal 65+ years (2 of 2 - PPSV23) 04/03/2018    Influenza Age 5 to Adult  08/01/2019    DTaP/Tdap/Td series (2 - Td) 03/11/2021       Objective:  Visit Vitals  /82 (BP 1 Location: Left arm, BP Patient Position: Sitting)   Pulse 75   Temp 97.9 °F (36.6 °C) (Oral)   Resp 16   Ht 5' 10\" (1.778 m)   Wt 196 lb (88.9 kg)   SpO2 96%   BMI 28.12 kg/m²          Nurses notes and VS reviewed. Physical Examination: General appearance - alert, well appearing, and in no distress        Labwork and Ancillary Studies:    CBC w/Diff  Lab Results   Component Value Date/Time    WBC 5.3 04/30/2019 10:05 AM    HGB 14.2 04/30/2019 10:05 AM    PLATELET 345 21/19/5215 10:05 AM         Basic Metabolic Profile  Lab Results   Component Value Date/Time    Sodium 141 04/30/2019 10:05 AM    Potassium 4.4 04/30/2019 10:05 AM    Chloride 107 04/30/2019 10:05 AM    CO2 29 04/30/2019 10:05 AM    Anion gap 5 04/30/2019 10:05 AM    Glucose 84 04/30/2019 10:05 AM    BUN 12 04/30/2019 10:05 AM    Creatinine 1.21 04/30/2019 10:05 AM    BUN/Creatinine ratio 10 (L) 04/30/2019 10:05 AM    GFR est AA >60 04/30/2019 10:05 AM    GFR est non-AA 59 (L) 04/30/2019 10:05 AM    Calcium 9.1 04/30/2019 10:05 AM         LFT  Lab Results   Component Value Date/Time    ALT (SGPT) 31 04/30/2019 10:05 AM    AST (SGOT) 22 04/30/2019 10:05 AM    Alk.  phosphatase 52 04/30/2019 10:05 AM    Bilirubin, direct 0.2 04/30/2019 10:05 AM    Bilirubin, total 0.6 04/30/2019 10:05 AM         Cholesterol  Lab Results   Component Value Date/Time    Cholesterol, total 146 04/30/2019 10:05 AM    HDL Cholesterol 51 04/30/2019 10:05 AM    LDL, calculated 76.4 04/30/2019 10:05 AM    Triglyceride 93 04/30/2019 10:05 AM    CHOL/HDL Ratio 2.9 04/30/2019 10:05 AM

## 2019-10-19 DIAGNOSIS — I10 ESSENTIAL HYPERTENSION: ICD-10-CM

## 2019-10-19 DIAGNOSIS — E78.49 OTHER HYPERLIPIDEMIA: ICD-10-CM

## 2019-10-20 RX ORDER — ATORVASTATIN CALCIUM 20 MG/1
TABLET, FILM COATED ORAL
Qty: 90 TAB | Refills: 1 | Status: SHIPPED | OUTPATIENT
Start: 2019-10-20 | End: 2020-02-21 | Stop reason: SDUPTHER

## 2019-10-20 RX ORDER — LOSARTAN POTASSIUM 100 MG/1
TABLET ORAL
Qty: 90 TAB | Refills: 1 | Status: SHIPPED | OUTPATIENT
Start: 2019-10-20 | End: 2020-02-21 | Stop reason: SDUPTHER

## 2019-12-02 RX ORDER — CHLORTHALIDONE 25 MG/1
TABLET ORAL
Qty: 90 TAB | Refills: 1 | Status: SHIPPED | OUTPATIENT
Start: 2019-12-02 | End: 2020-02-21 | Stop reason: SDUPTHER

## 2019-12-12 ENCOUNTER — OFFICE VISIT (OUTPATIENT)
Dept: FAMILY MEDICINE CLINIC | Age: 69
End: 2019-12-12

## 2019-12-12 VITALS
OXYGEN SATURATION: 96 % | HEIGHT: 70 IN | RESPIRATION RATE: 16 BRPM | BODY MASS INDEX: 29.2 KG/M2 | HEART RATE: 69 BPM | TEMPERATURE: 97.9 F | DIASTOLIC BLOOD PRESSURE: 75 MMHG | SYSTOLIC BLOOD PRESSURE: 118 MMHG | WEIGHT: 204 LBS

## 2019-12-12 DIAGNOSIS — E78.49 OTHER HYPERLIPIDEMIA: ICD-10-CM

## 2019-12-12 DIAGNOSIS — E55.9 HYPOVITAMINOSIS D: ICD-10-CM

## 2019-12-12 DIAGNOSIS — I10 ESSENTIAL HYPERTENSION: Primary | ICD-10-CM

## 2019-12-12 DIAGNOSIS — Z12.5 PROSTATE CANCER SCREENING: ICD-10-CM

## 2019-12-12 NOTE — PATIENT INSTRUCTIONS
High Blood Pressure: Care Instructions Overview It's normal for blood pressure to go up and down throughout the day. But if it stays up, you have high blood pressure. Another name for high blood pressure is hypertension. Despite what a lot of people think, high blood pressure usually doesn't cause headaches or make you feel dizzy or lightheaded. It usually has no symptoms. But it does increase your risk of stroke, heart attack, and other problems. You and your doctor will talk about your risks of these problems based on your blood pressure. Your doctor will give you a goal for your blood pressure. Your goal will be based on your health and your age. Lifestyle changes, such as eating healthy and being active, are always important to help lower blood pressure. You might also take medicine to reach your blood pressure goal. 
Follow-up care is a key part of your treatment and safety. Be sure to make and go to all appointments, and call your doctor if you are having problems. It's also a good idea to know your test results and keep a list of the medicines you take. How can you care for yourself at home? Medical treatment · If you stop taking your medicine, your blood pressure will go back up. You may take one or more types of medicine to lower your blood pressure. Be safe with medicines. Take your medicine exactly as prescribed. Call your doctor if you think you are having a problem with your medicine. · Talk to your doctor before you start taking aspirin every day. Aspirin can help certain people lower their risk of a heart attack or stroke. But taking aspirin isn't right for everyone, because it can cause serious bleeding. · See your doctor regularly. You may need to see the doctor more often at first or until your blood pressure comes down. · If you are taking blood pressure medicine, talk to your doctor before you take decongestants or anti-inflammatory medicine, such as ibuprofen. Some of these medicines can raise blood pressure. · Learn how to check your blood pressure at home. Lifestyle changes · Stay at a healthy weight. This is especially important if you put on weight around the waist. Losing even 10 pounds can help you lower your blood pressure. · If your doctor recommends it, get more exercise. Walking is a good choice. Bit by bit, increase the amount you walk every day. Try for at least 30 minutes on most days of the week. You also may want to swim, bike, or do other activities. · Avoid or limit alcohol. Talk to your doctor about whether you can drink any alcohol. · Try to limit how much sodium you eat to less than 2,300 milligrams (mg) a day. Your doctor may ask you to try to eat less than 1,500 mg a day. · Eat plenty of fruits (such as bananas and oranges), vegetables, legumes, whole grains, and low-fat dairy products. · Lower the amount of saturated fat in your diet. Saturated fat is found in animal products such as milk, cheese, and meat. Limiting these foods may help you lose weight and also lower your risk for heart disease. · Do not smoke. Smoking increases your risk for heart attack and stroke. If you need help quitting, talk to your doctor about stop-smoking programs and medicines. These can increase your chances of quitting for good. When should you call for help? Call  911 anytime you think you may need emergency care. This may mean having symptoms that suggest that your blood pressure is causing a serious heart or blood vessel problem. Your blood pressure may be over 180/120. 
 For example, call  911 if: 
  · You have symptoms of a heart attack. These may include: 
? Chest pain or pressure, or a strange feeling in the chest. 
? Sweating. ? Shortness of breath. ? Nausea or vomiting. ? Pain, pressure, or a strange feeling in the back, neck, jaw, or upper belly or in one or both shoulders or arms. ? Lightheadedness or sudden weakness. ? A fast or irregular heartbeat.  
  · You have symptoms of a stroke. These may include: 
? Sudden numbness, tingling, weakness, or loss of movement in your face, arm, or leg, especially on only one side of your body. ? Sudden vision changes. ? Sudden trouble speaking. ? Sudden confusion or trouble understanding simple statements. ? Sudden problems with walking or balance. ? A sudden, severe headache that is different from past headaches.  
  · You have severe back or belly pain.  
 Do not wait until your blood pressure comes down on its own. Get help right away. 
 Call your doctor now or seek immediate care if: 
  · Your blood pressure is much higher than normal (such as 180/120 or higher), but you don't have symptoms.  
  · You think high blood pressure is causing symptoms, such as: 
? Severe headache. 
? Blurry vision.  
 Watch closely for changes in your health, and be sure to contact your doctor if: 
  · Your blood pressure measures higher than your doctor recommends at least 2 times. That means the top number is higher or the bottom number is higher, or both.  
  · You think you may be having side effects from your blood pressure medicine. Where can you learn more? Go to http://kris-ntio.info/. Enter N215 in the search box to learn more about \"High Blood Pressure: Care Instructions. \" Current as of: April 9, 2019 Content Version: 12.2 © 3110-4458 [employer]. Care instructions adapted under license by [employer] (which disclaims liability or warranty for this information). If you have questions about a medical condition or this instruction, always ask your healthcare professional. [place] [address] any warranty or liability for your use of this information.

## 2019-12-12 NOTE — PROGRESS NOTES
Assessment/Plan:    *Diagnoses and all orders for this visit:    1. Essential hypertension    Other orders  -     PSA SCREENING (SCREENING); Future  -     CBC WITH AUTOMATED DIFF; Future  -     HEPATIC FUNCTION PANEL; Future  -     LIPID PANEL; Future  -     METABOLIC PANEL, BASIC; Future  -     TSH 3RD GENERATION; Future  -     T4, FREE; Future  -     URINALYSIS W/ RFLX MICROSCOPIC; Future  -     VITAMIN D, 25 HYDROXY; Future        Will return for a physical in June. BW prior. The plan was discussed with the patient. The patient verbalized understanding and is in agreement with the plan. All medication potential side effects were discussed with the patient.    -------------------------------------------------------------------------------------------------------------------        Mitra Knox is a 71 y.o. male and presents with Hypertension         Subjective:  Pt here for f/u of HTN. Has been stable, no changes. BP is doing very well. No new complaints. Review of Systems - ENT ROS: negative  Respiratory ROS: no cough, shortness of breath, or wheezing  Cardiovascular ROS: no chest pain or dyspnea on exertion  Gastrointestinal ROS: no abdominal pain, change in bowel habits, or black or bloody stools  Genito-Urinary ROS: no dysuria, trouble voiding, or hematuria         The problem list was updated as a part of today's visit. There is no problem list on file for this patient. The PSH, FH were reviewed.         SH:  Social History     Tobacco Use    Smoking status: Never Smoker    Smokeless tobacco: Never Used   Substance Use Topics    Alcohol use: Not Currently    Drug use: Never       Medications/Allergies:  Current Outpatient Medications on File Prior to Visit   Medication Sig Dispense Refill    chlorthalidone (HYGROTEN) 25 mg tablet TAKE 1 TABLET BY MOUTH EVERY DAY 90 Tab 1    atorvastatin (LIPITOR) 20 mg tablet TAKE 1 TABLET BY MOUTH EVERY DAY 90 Tab 1    losartan (COZAAR) 100 mg tablet TAKE 1 TABLET BY MOUTH EVERY DAY 90 Tab 1    latanoprost (XALATAN) 0.005 % ophthalmic solution Administer 0.005 Drops to both eyes daily. 3    pilocarpine (PILOCAR) 1 % ophthalmic solution Apply 1 Drop to eye two (2) times a day. 3    cholecalciferol (VITAMIN D3) 1,000 unit cap Take  by mouth daily.  amLODIPine (NORVASC) 10 mg tablet Take 1 Tab by mouth daily. 90 Tab 1     No current facility-administered medications on file prior to visit. No Known Allergies      Health Maintenance:   Health Maintenance   Topic Date Due    Hepatitis C Screening  1950    Shingrix Vaccine Age 50> (1 of 2) 01/24/2000    FOBT Q 1 YEAR AGE 50-75  01/24/2000    Pneumococcal 65+ years (2 of 2 - PPSV23) 04/03/2018    Influenza Age 5 to Adult  08/01/2019    DTaP/Tdap/Td series (2 - Td) 03/11/2021    GLAUCOMA SCREENING Q2Y  05/17/2021       Objective:  Visit Vitals  /75   Pulse 69   Temp 97.9 °F (36.6 °C) (Oral)   Resp 16   Ht 5' 10\" (1.778 m)   Wt 204 lb (92.5 kg)   SpO2 96%   BMI 29.27 kg/m²          Nurses notes and VS reviewed.           Physical Examination: General appearance - alert, well appearing, and in no distress  Chest - clear to auscultation, no wheezes, rales or rhonchi, symmetric air entry  Heart - normal rate, regular rhythm, normal S1, S2, no murmurs, rubs, clicks or gallops      Labwork and Ancillary Studies:    CBC w/Diff  Lab Results   Component Value Date/Time    WBC 5.3 04/30/2019 10:05 AM    HGB 14.2 04/30/2019 10:05 AM    PLATELET 323 44/79/5967 10:05 AM         Basic Metabolic Profile  Lab Results   Component Value Date/Time    Sodium 141 04/30/2019 10:05 AM    Potassium 4.4 04/30/2019 10:05 AM    Chloride 107 04/30/2019 10:05 AM    CO2 29 04/30/2019 10:05 AM    Anion gap 5 04/30/2019 10:05 AM    Glucose 84 04/30/2019 10:05 AM    BUN 12 04/30/2019 10:05 AM    Creatinine 1.21 04/30/2019 10:05 AM    BUN/Creatinine ratio 10 (L) 04/30/2019 10:05 AM    GFR est AA >60 04/30/2019 10:05 AM    GFR est non-AA 59 (L) 04/30/2019 10:05 AM    Calcium 9.1 04/30/2019 10:05 AM         LFT  Lab Results   Component Value Date/Time    ALT (SGPT) 31 04/30/2019 10:05 AM    AST (SGOT) 22 04/30/2019 10:05 AM    Alk.  phosphatase 52 04/30/2019 10:05 AM    Bilirubin, direct 0.2 04/30/2019 10:05 AM    Bilirubin, total 0.6 04/30/2019 10:05 AM         Cholesterol  Lab Results   Component Value Date/Time    Cholesterol, total 146 04/30/2019 10:05 AM    HDL Cholesterol 51 04/30/2019 10:05 AM    LDL, calculated 76.4 04/30/2019 10:05 AM    Triglyceride 93 04/30/2019 10:05 AM    CHOL/HDL Ratio 2.9 04/30/2019 10:05 AM

## 2019-12-12 NOTE — PROGRESS NOTES
Annemarie Correa is a 71 y.o. male (: 1950) presenting to address:    Chief Complaint   Patient presents with    Hypertension       Vitals:    19 1055   BP: 118/75   Pulse: 69   Resp: 16   Temp: 97.9 °F (36.6 °C)   TempSrc: Oral   SpO2: 96%   Weight: 204 lb (92.5 kg)   Height: 5' 10\" (1.778 m)   PainSc:   0 - No pain       Hearing/Vision:   No exam data present    Learning Assessment:     Learning Assessment 2019   PRIMARY LEARNER Patient   HIGHEST LEVEL OF EDUCATION - PRIMARY LEARNER  > 4 YEARS OF COLLEGE   BARRIERS PRIMARY LEARNER NONE   PRIMARY LANGUAGE ENGLISH   LEARNER PREFERENCE PRIMARY LISTENING   ANSWERED BY patient    RELATIONSHIP SELF     Depression Screening:     3 most recent PHQ Screens 2019   Little interest or pleasure in doing things Not at all   Feeling down, depressed, irritable, or hopeless Not at all   Total Score PHQ 2 0     Fall Risk Assessment:     Fall Risk Assessment, last 12 mths 2019   Able to walk? Yes   Fall in past 12 months? Yes     Abuse Screening:     Abuse Screening Questionnaire 2019   Do you ever feel afraid of your partner? N   Are you in a relationship with someone who physically or mentally threatens you? N   Is it safe for you to go home? Y     Coordination of Care Questionaire:   1. Have you been to the ER, urgent care clinic since your last visit? Hospitalized since your last visit? NO    2. Have you seen or consulted any other health care providers outside of the [address] [place] [address] since your last visit? Include any pap smears or colon screening. NO    Advanced Directive:   1. Do you have an Advanced Directive? NO    2. Would you like information on Advanced Directives?  NO

## 2019-12-24 ENCOUNTER — APPOINTMENT (OUTPATIENT)
Dept: CT IMAGING | Age: 69
End: 2019-12-24
Attending: PHYSICIAN ASSISTANT
Payer: MEDICARE

## 2019-12-24 ENCOUNTER — APPOINTMENT (OUTPATIENT)
Dept: GENERAL RADIOLOGY | Age: 69
End: 2019-12-24
Attending: PHYSICIAN ASSISTANT
Payer: MEDICARE

## 2019-12-24 ENCOUNTER — HOSPITAL ENCOUNTER (EMERGENCY)
Age: 69
Discharge: HOME | End: 2019-12-24
Attending: EMERGENCY MEDICINE | Admitting: EMERGENCY MEDICINE
Payer: MEDICARE

## 2019-12-24 VITALS
TEMPERATURE: 98.7 F | SYSTOLIC BLOOD PRESSURE: 149 MMHG | WEIGHT: 203 LBS | HEIGHT: 71 IN | BODY MASS INDEX: 28.42 KG/M2 | RESPIRATION RATE: 16 BRPM | DIASTOLIC BLOOD PRESSURE: 97 MMHG | HEART RATE: 62 BPM | OXYGEN SATURATION: 100 %

## 2019-12-24 DIAGNOSIS — S06.0X0A CONCUSSION WITHOUT LOSS OF CONSCIOUSNESS, INITIAL ENCOUNTER: Primary | ICD-10-CM

## 2019-12-24 DIAGNOSIS — S20.211A CONTUSION OF RIGHT CHEST WALL, INITIAL ENCOUNTER: ICD-10-CM

## 2019-12-24 PROCEDURE — 99281 EMR DPT VST MAYX REQ PHY/QHP: CPT

## 2019-12-24 PROCEDURE — 71046 X-RAY EXAM CHEST 2 VIEWS: CPT

## 2019-12-24 PROCEDURE — 70450 CT HEAD/BRAIN W/O DYE: CPT

## 2019-12-24 NOTE — ED TRIAGE NOTES
Patient states last Thursday he tripped and fell, no LOC, did hit his head (above the right eyebrow) and R ribs (below the breast), states it hurst to take a dep breath.

## 2019-12-24 NOTE — DISCHARGE INSTRUCTIONS
Learning About a Closed Head Injury  What is a closed head injury? A closed head injury happens when your head gets hit hard. The strong force of the blow causes your brain to shake in your skull. This movement can cause the brain to bruise, swell, or tear. Sometimes nerves or blood vessels also get damaged. This can cause bleeding in or around the brain. A concussion is a type of closed head injury. What are the symptoms? If you have a mild concussion, you may have a mild headache or feel \"not quite right. \" These symptoms are common. They usually go away over a few days to 4 weeks. But sometimes after a concussion, you feel like you can't function as well as before the injury. And you have new symptoms. This is called postconcussive syndrome. You may:  · Find it harder to solve problems, think, concentrate, or remember. · Have headaches. · Have changes in your sleep patterns, such as not being able to sleep or sleeping all the time. · Have changes in your personality. · Not be interested in your usual activities. · Feel angry or anxious without a clear reason. · Lose your sense of taste or smell. · Be dizzy, lightheaded, or unsteady. It may be hard to stand or walk. How is a closed head injury treated? Any person who may have a concussion needs to see a doctor. Some people have to stay in the hospital to be watched. Others can go home safely. If you go home, follow your doctor's instructions. He or she will tell you if you need someone to watch you closely for the next 24 hours or longer. Rest is the best treatment. Get plenty of sleep at night. And try to rest during the day. · Avoid activities that are physically or mentally demanding. These include housework, exercise, and schoolwork. And don't play video games, send text messages, or use the computer. You may need to change your school or work schedule to be able to avoid these activities.   · Ask your doctor when it's okay to drive, ride a bike, or operate machinery. · Take an over-the-counter pain medicine, such as acetaminophen (Tylenol), ibuprofen (Advil, Motrin), or naproxen (Aleve). Be safe with medicines. Read and follow all instructions on the label. · Check with your doctor before you use any other medicines for pain. · Do not drink alcohol or use illegal drugs. They can slow recovery. They can also increase your risk of getting a second head injury. Follow-up care is a key part of your treatment and safety. Be sure to make and go to all appointments, and call your doctor if you are having problems. It's also a good idea to know your test results and keep a list of the medicines you take. Where can you learn more? Go to http://kris-nito.info/. Enter E235 in the search box to learn more about \"Learning About a Closed Head Injury. \"  Current as of: March 28, 2019  Content Version: 12.2  © 2162-4810 [employer]. Care instructions adapted under license by [employer] (which disclaims liability or warranty for this information). If you have questions about a medical condition or this instruction, always ask your healthcare professional. [place] [address] any warranty or liability for your use of this information. Patient Education        Chest Contusion: Care Instructions  Your Care Instructions  A chest contusion, or bruise, is caused by a fall or direct blow to the chest. Car crashes, falls, getting punched, and injury from bicycle handlebars are common causes of chest contusions. A very forceful blow to the chest can injure the heart or blood vessels in the chest, the lungs, the airway, the liver, or the spleen. Pain may be caused by an injury to muscles, cartilage, or ribs. Deep breathing, coughing, or sneezing can increase your pain. Lying on the injured area also can cause pain. Follow-up care is a key part of your treatment and safety.  Be sure to make and go to all appointments, and call your doctor if you are having problems. It's also a good idea to know your test results and keep a list of the medicines you take. How can you care for yourself at home? · Rest and protect the injured or sore area. Stop, change, or take a break from any activity that may be causing your pain. · Put ice or a cold pack on the area for 10 to 20 minutes at a time. Put a thin cloth between the ice and your skin. · After 2 or 3 days, if your swelling is gone, apply a heating pad set on low or a warm cloth to your chest. Some doctors suggest that you go back and forth between hot and cold. Put a thin cloth between the heating pad and your skin. · Do not wrap or tape your ribs for support. This may cause you to take smaller breaths, which could increase your risk of pneumonia and lung collapse. · Ask your doctor if you can take an over-the-counter pain medicine, such as acetaminophen (Tylenol), ibuprofen (Advil, Motrin), or naproxen (Aleve). Be safe with medicines. Read and follow all instructions on the label. · Take your medicines exactly as prescribed. Call your doctor if you think you are having a problem with your medicine. · Gentle stretching and massage may help you feel better after a few days of rest. Stretch slowly to the point just before discomfort begins, then hold the stretch for at least 15 to 30 seconds. Do this 3 or 4 times per day. · As your pain gets better, slowly return to your normal activities. Be patient, because chest bruises can take weeks or months to heal. Any increased pain may be a sign that you need to rest a while longer. When should you call for help? Call 911 anytime you think you may need emergency care.  For example, call if:    · You have severe trouble breathing.     · You cough up blood.    Call your doctor now or seek immediate medical care if:    · You have belly pain.     · You are dizzy or lightheaded, or you feel like you may faint.     · You develop new symptoms with the chest pain.     · Your chest pain gets worse.     · You have a fever.     · You have some shortness of breath.     · You have a cough that brings up mucus from the lungs.    Watch closely for changes in your health, and be sure to contact your doctor if:    · Your chest pain is not improving after 1 week. Where can you learn more? Go to http://kris-nito.info/. Enter I174 in the search box to learn more about \"Chest Contusion: Care Instructions. \"  Current as of: June 26, 2019  Content Version: 12.2  © 4445-2304 [employer]. Care instructions adapted under license by [employer] (which disclaims liability or warranty for this information). If you have questions about a medical condition or this instruction, always ask your healthcare professional. [address] any warranty or liability for your use of this information. Patient Education        Bruised Rib: Care Instructions  Overview    You can get a bruised rib if you fall or get hit, such as in an accident or while playing sports. The medical term for a bruise is \"contusion. \" Small blood vessels get torn and leak blood under the skin. Most people think of a bruise as a black-and-blue area. But bones and muscles can also get bruised. An injury may damage the rib but not cause a bruise that you can see. Sometimes it can be hard to tell if a rib is bruised or broken. The symptoms may be the same. And a broken bone can't always be seen on an X-ray. But the treatment for a bruised rib is often the same as treatment for a broken one. An injury to the ribs can cause pain. The pain may be worse when you breathe deeply, cough, or sneeze. In most cases, a bruised rib will heal on its own. You can take pain medicine while the rib mends. Pain relief allows you to take deep breaths. Follow-up care is a key part of your treatment and safety.  Be sure to make and go to all appointments, and call your doctor if you are having problems. It's also a good idea to know your test results and keep a list of the medicines you take. How can you care for yourself at home? · Rest and protect the injured or sore area. Stop, change, or take a break from any activity that causes pain. · Put ice or a cold pack on the area for 10 to 20 minutes at a time. Put a thin cloth between the ice and your skin. · After 2 or 3 days, if your swelling is gone, put a heating pad set on low or a warm cloth on your chest. Some doctors suggest that you go back and forth between hot and cold. Put a thin cloth between the heating pad and your skin. · Ask your doctor if you can take an over-the-counter pain medicine, such as acetaminophen (Tylenol), ibuprofen (Advil, Motrin), or naproxen (Aleve). Be safe with medicines. Read and follow all instructions on the label. · As your pain gets better, slowly return to your normal activities. Be patient. Rib bruises can take weeks or months to heal. If the pain gets worse, it may be a sign that you need to rest a while longer. When should you call for help? Call 911 anytime you think you may need emergency care. For example, call if:    · You have severe trouble breathing.     Call your doctor now or seek immediate medical care if:    · You have trouble breathing.     · You have a fever.     · You have a new or worse cough.     · You have new or worse pain.    Watch closely for changes in your health, and be sure to contact your doctor if:    · You do not get better as expected. Where can you learn more? Go to http://kris-nito.info/. Enter R125 in the search box to learn more about \"Bruised Rib: Care Instructions. \"  Current as of: June 26, 2019  Content Version: 12.2  © 7456-1572 [employer]. Care instructions adapted under license by [employer] (which disclaims liability or warranty for this information).  If you have questions about a medical condition or this instruction, always ask your healthcare professional. [place] [address] any warranty or liability for your use of this information.

## 2019-12-25 NOTE — ED PROVIDER NOTES
EMERGENCY DEPARTMENT HISTORY AND PHYSICAL EXAM    Date: 12/24/2019  Patient Name: Rhianna Vanegas    History of Presenting Illness     Chief Complaint   Patient presents with   [facility] Fall    Rib Pain    Other         History Provided By: patient        Additional History (Context): Rhianna Vanegas is a 60-year-old male presenting to the emergency department with headache, dizziness off and on for the past week. Patient states approximately a week and half ago, he fell hit his head and right side of rib. Patient states he missed a step causing him to trip. There were no symptoms including chest pain, headache, dizziness, shortness of breath prior to falling, states it was strictly mechanical.  Has had pain in these areas since. Did not get seen at the time of injury. Did not lose consciousness, wife was with him at this time accident. He does not have any symptoms at this time. No headache, dizziness, neck pain, back pain, sob, cp. Pt denies any fevers or chills, headache, dizziness or light headedness, ENT issues, CP or discomfort, SOB, cough, diaphoresis, melena/hematochezia, dysuria, hematuria, frequency, focal weakness/numbness/tingling, or rash. Patient has no other complaints at this time. States friend told pt to come to ED for CT scan. Does not take blood thinners or drink etoh. PCP: Rani Valerio MD    Current Outpatient Medications   Medication Sig Dispense Refill    chlorthalidone (HYGROTEN) 25 mg tablet TAKE 1 TABLET BY MOUTH EVERY DAY 90 Tab 1    atorvastatin (LIPITOR) 20 mg tablet TAKE 1 TABLET BY MOUTH EVERY DAY 90 Tab 1    losartan (COZAAR) 100 mg tablet TAKE 1 TABLET BY MOUTH EVERY DAY 90 Tab 1    latanoprost (XALATAN) 0.005 % ophthalmic solution Administer 0.005 Drops to both eyes daily. 3    pilocarpine (PILOCAR) 1 % ophthalmic solution Apply 1 Drop to eye two (2) times a day. 3    cholecalciferol (VITAMIN D3) 1,000 unit cap Take  by mouth daily.       amLODIPine (NORVASC) 10 mg tablet Take 1 Tab by mouth daily. 80 Tab 1       Past History     Past Medical History:  Past Medical History:   Diagnosis Date    High cholesterol     Hypertension        Past Surgical History:  Past Surgical History:   Procedure Laterality Date    HX COLECTOMY  2015    colon cancer     HX HERNIA REPAIR  2015    HX PROSTATECTOMY  2012       Family History:  Family History   Problem Relation Age of Onset    Arthritis Mother     Hypertension Father     Alcohol abuse Father     Hypertension Maternal Grandfather     Alcohol abuse Brother     Drug Abuse Brother     No Known Problems Maternal Grandmother     No Known Problems Paternal Grandmother     No Known Problems Paternal Grandfather        Social History:  Social History     Tobacco Use    Smoking status: Never Smoker    Smokeless tobacco: Never Used   Substance Use Topics    Alcohol use: Not Currently    Drug use: Never       Allergies:  No Known Allergies      Review of Systems       Review of Systems   Constitutional: Negative for chills and fever. HENT: Negative for nasal congestion, sore throat, rhinorrhea  Eyes: Negative. Respiratory: Negative for cough and negative for shortness of breath. Cardiovascular: Negative for chest pain and palpitations. Gastrointestinal: Negative for abdominal pain, constipation, diarrhea, nausea and vomiting. Genitourinary: Negative. Negative for difficulty urinating and flank pain. Musculoskeletal: Negative for back pain. Negative for gait problem and neck pain. Skin: Negative. Allergic/Immunologic: Negative. Neurological: Negative for dizziness, weakness, numbness and headaches. Psychiatric/Behavioral: Negative. All other systems reviewed and are negative.   All Other Systems Negative  Physical Exam     Vitals:    12/24/19 1046   BP: (!) 149/97   Pulse: 62   Resp: 16   Temp: 98.7 °F (37.1 °C)   SpO2: 100%   Weight: 92.1 kg (203 lb)   Height: 5' 10.5\" (1.791 m)     Physical Exam  Vitals signs and nursing note reviewed. Constitutional:       General: He is not in acute distress. Appearance: He is well-developed. He is not diaphoretic. Comments: NAD, well hydrated, non toxic     HENT:      Head: Normocephalic and atraumatic. Nose: Nose normal.      Mouth/Throat:      Pharynx: No oropharyngeal exudate. Eyes:      Conjunctiva/sclera: Conjunctivae normal.      Pupils: Pupils are equal, round, and reactive to light. Neck:      Musculoskeletal: Normal range of motion and neck supple. Cardiovascular:      Rate and Rhythm: Normal rate and regular rhythm. Heart sounds: Normal heart sounds. No murmur. Pulmonary:      Effort: Pulmonary effort is normal. No respiratory distress. Breath sounds: Normal breath sounds. No wheezing or rales. Chest:      Chest wall: No mass, lacerations, deformity, swelling, tenderness, crepitus or edema. There is no dullness to percussion. Abdominal:      General: There is no distension. Palpations: Abdomen is soft. Tenderness: There is no tenderness. There is no guarding. Comments: No abdominal pain   Musculoskeletal: Normal range of motion. Lymphadenopathy:      Cervical: No cervical adenopathy. Skin:     General: Skin is warm. Findings: No rash. Neurological:      General: No focal deficit present. Mental Status: He is alert and oriented to person, place, and time. Mental status is at baseline. Cranial Nerves: No cranial nerve deficit. Sensory: No sensory deficit. Motor: No weakness. Coordination: Coordination normal.      Gait: Gait normal.      Deep Tendon Reflexes: Reflexes normal.   Psychiatric:         Behavior: Behavior normal.           Diagnostic Study Results     Labs -   No results found for this or any previous visit (from the past 12 hour(s)).     Radiologic Studies -   XR CHEST PA LAT   Final Result   IMPRESSION:      No acute findings in the chest.      CT HEAD WO CONT   Final Result   IMPRESSION:         1. No acute intracranial abnormality. CT Results  (Last 48 hours)               12/24/19 1113  CT HEAD WO CONT Final result    Impression:  IMPRESSION:           1. No acute intracranial abnormality. Narrative:  EXAM: CT head       INDICATION: Fall, striking head right eyebrow pain       COMPARISON: None. TECHNIQUE: Axial CT imaging of the head was performed without intravenous   contrast. Standard multiplanar coronal and sagittal reformatted images were   obtained and are included in interpretation. One or more dose reduction techniques were used on this CT: automated exposure   control, adjustment of the mAs and/or kVp according to patient size, and   iterative reconstruction techniques. The specific techniques used on this CT   exam have been documented in the patient's electronic medical record. Digital   Imaging and Communications in Medicine (DICOM) format image data are available   to nonaffiliated external healthcare facilities or entities on a secure, media   free, reciprocally searchable basis with patient authorization for at least a   12-month period after this study. _______________       FINDINGS:       BRAIN AND POSTERIOR FOSSA: The sulci, folia, ventricles and basal cisterns are   within normal limits for the patient?s age. There is no intracranial hemorrhage,   mass effect, or midline shift. The gray-white matter differentiation appears   within normal limits. EXTRA-AXIAL SPACES AND MENINGES: No acute extra-axial fluid collection is   present. CALVARIUM: Intact. SINUSES: The imaged paranasal sinuses and mastoid air cells are clear.        OTHER: None.       _______________               CXR Results  (Last 48 hours)               12/24/19 1122  XR CHEST PA LAT Final result    Impression:  IMPRESSION:       No acute findings in the chest.       Narrative:  EXAM: XR CHEST PA LAT       CLINICAL INDICATION/HISTORY: SOB rib pain   -Additional: None       COMPARISON: None       TECHNIQUE: PA and lateral views of the chest       _______________       FINDINGS:       HEART AND MEDIASTINUM: Cardiac size and mediastinal contours are within normal   limits       LUNGS AND PLEURAL SPACES: No focal pneumonic consolidation, pneumothorax or   pleural effusion       BONY THORAX AND SOFT TISSUES: No acute osseous abnormality       _______________                   Medical Decision Making   I am the first provider for this patient. I reviewed the vital signs, available nursing notes, past medical history, past surgical history, family history and social history. Vital Signs-Reviewed the patient's vital signs. Records Reviewed: Nursing notes, old medical records and any previous labs, imaging, visits, consultations pertinent to patient care    Procedures:  Procedures      ED Course: Progress Notes, Reevaluation, and Consults:      Provider Notes (Medical Decision Making):   70-year-old male presenting to the emergency department for continuing headache and slight dizziness off and on for the past week since falling. No signs symptoms previous to fall, states it strictly mechanical.  Did hit his head and hit right side of chest.  He has no chest pain or shortness of breath. States there is a specific area on his right rib that is tender to palpation. Abdomen is soft and nontender, exam of chest and chest wall negative for myself. Lungs clear to auscultation. Neurovascularly intact. No neck pain or back pain. CAT scan of brain negative for acute pathology, consistent with possible concussion. Ribs and lung within normal limits on x-ray. Patient instructed to follow-up with primary care physician, understands symptoms may continue for a few weeks, will also give neurology for follow-up. MED RECONCILIATION:  No current facility-administered medications for this encounter.       Current Outpatient Medications   Medication Sig    chlorthalidone (HYGROTEN) 25 mg tablet TAKE 1 TABLET BY MOUTH EVERY DAY    atorvastatin (LIPITOR) 20 mg tablet TAKE 1 TABLET BY MOUTH EVERY DAY    losartan (COZAAR) 100 mg tablet TAKE 1 TABLET BY MOUTH EVERY DAY    latanoprost (XALATAN) 0.005 % ophthalmic solution Administer 0.005 Drops to both eyes daily.  pilocarpine (PILOCAR) 1 % ophthalmic solution Apply 1 Drop to eye two (2) times a day.  cholecalciferol (VITAMIN D3) 1,000 unit cap Take  by mouth daily.  amLODIPine (NORVASC) 10 mg tablet Take 1 Tab by mouth daily. Disposition:  home    DISCHARGE NOTE:     Pt has been reexamined. Patient has no new complaints, changes, or physical findings. Care plan outlined and precautions discussed. Discussed proper way to take medications. Discussed treatment plan, return precautions, symptomatic relief, and expected time to improvement. All questions answered. Patient is stable for discharge and outpatient management. Patient is ready to go home. Follow-up Information     Follow up With Specialties Details Why [address] [facility] EMERGENCY DEPT Emergency Medicine   [address] [place] [address]    Alejandro Paiz MD Internal Medicine   [address]  [address] [facility]      Andrés Fuentes MD Neurology   [address] [place] [id number] [phone]            Discharge Medication List as of 12/24/2019 12:17 PM                Diagnosis     Clinical Impression:   1. Concussion without loss of consciousness, initial encounter    2. Contusion of right chest wall, initial encounter            Dictation disclaimer:  Please note that this dictation was completed with [employer], the [employer] voice recognition software. Quite often unanticipated grammatical, syntax, homophones, and other interpretive errors are inadvertently transcribed by the computer software. Please disregard these errors.   Please excuse any errors that have escaped final proofreading.

## 2020-02-21 DIAGNOSIS — E78.49 OTHER HYPERLIPIDEMIA: ICD-10-CM

## 2020-02-21 DIAGNOSIS — I10 ESSENTIAL HYPERTENSION: ICD-10-CM

## 2020-02-21 RX ORDER — AMLODIPINE BESYLATE 10 MG/1
10 TABLET ORAL DAILY
Qty: 90 TAB | Refills: 0 | Status: SHIPPED | OUTPATIENT
Start: 2020-02-21 | End: 2020-05-19

## 2020-02-21 RX ORDER — ATORVASTATIN CALCIUM 20 MG/1
TABLET, FILM COATED ORAL
Qty: 90 TAB | Refills: 0 | Status: SHIPPED | OUTPATIENT
Start: 2020-02-21 | End: 2020-05-19

## 2020-02-21 RX ORDER — LOSARTAN POTASSIUM 100 MG/1
TABLET ORAL
Qty: 90 TAB | Refills: 0 | Status: SHIPPED | OUTPATIENT
Start: 2020-02-21 | End: 2020-04-28

## 2020-02-21 RX ORDER — CHLORTHALIDONE 25 MG/1
TABLET ORAL
Qty: 90 TAB | Refills: 0 | Status: SHIPPED | OUTPATIENT
Start: 2020-02-21 | End: 2020-05-19

## 2020-02-21 NOTE — TELEPHONE ENCOUNTER
Patient called about med refills. He switched pharmacies to [facility]. They need new rx. [facility] did not transfer rx. He is out of atorvastatin. Pending rx to pcp. Last ov 12/12/2019.

## 2020-02-26 NOTE — TELEPHONE ENCOUNTER
Future Appointments   Date Time Provider Tapan Weber   6/16/2020 11:00 AM Zulema Stallworth  [address]

## 2020-04-25 DIAGNOSIS — I10 ESSENTIAL HYPERTENSION: ICD-10-CM

## 2020-04-28 RX ORDER — LOSARTAN POTASSIUM 100 MG/1
TABLET ORAL
Qty: 90 TAB | Refills: 0 | Status: SHIPPED | OUTPATIENT
Start: 2020-04-28 | End: 2020-08-03

## 2020-06-16 ENCOUNTER — VIRTUAL VISIT (OUTPATIENT)
Dept: FAMILY MEDICINE CLINIC | Age: 70
End: 2020-06-16

## 2020-06-16 DIAGNOSIS — Z11.59 NEED FOR HEPATITIS C SCREENING TEST: ICD-10-CM

## 2020-06-16 DIAGNOSIS — I10 ESSENTIAL HYPERTENSION: ICD-10-CM

## 2020-06-16 DIAGNOSIS — E78.49 OTHER HYPERLIPIDEMIA: ICD-10-CM

## 2020-06-16 DIAGNOSIS — Z00.00 MEDICARE ANNUAL WELLNESS VISIT, SUBSEQUENT: Primary | ICD-10-CM

## 2020-06-16 NOTE — PROGRESS NOTES
This is the Subsequent Medicare Annual Wellness Exam, performed 12 months or more after the Initial AWV or the last Subsequent AWV    Consent: Yolande Phillips, who was seen by synchronous (real-time) audio-video technology, and/or his healthcare decision maker, is aware that this patient-initiated, Telehealth encounter on 6/16/2020 is a billable service. While AWVs are fully covered by Medicare, any services rendered on this date that are not included in an AWV are subject to additional billing, with coverage as determined by his insurance carrier. He is aware that he may receive a bill for any such additional services and has provided verbal consent to proceed: Yes. I have reviewed the patient's medical history in detail and updated the computerized patient record. History     Patient Active Problem List   Diagnosis Code    HLD (hyperlipidemia) E78.5    Hypertension I10     Past Medical History:   Diagnosis Date    High cholesterol     Hypertension       Past Surgical History:   Procedure Laterality Date    HX COLECTOMY  2015    colon cancer     HX HERNIA REPAIR  2015    HX PROSTATECTOMY  2012     Current Outpatient Medications   Medication Sig Dispense Refill    amLODIPine (NORVASC) 10 mg tablet TAKE 1 TABLET BY MOUTH DAILY 90 Tab 0    atorvastatin (LIPITOR) 20 mg tablet TAKE 1 TABLET BY MOUTH EVERY DAY 90 Tab 0    chlorthalidone (HYGROTEN) 25 mg tablet TAKE 1 TABLET BY MOUTH EVERY DAY 90 Tab 0    losartan (COZAAR) 100 mg tablet TAKE 1 TABLET BY MOUTH EVERY DAY 90 Tab 0    latanoprost (XALATAN) 0.005 % ophthalmic solution Administer 0.005 Drops to both eyes daily. 3    pilocarpine (PILOCAR) 1 % ophthalmic solution Apply 1 Drop to eye two (2) times a day. 3    cholecalciferol (VITAMIN D3) 1,000 unit cap Take  by mouth daily.        No Known Allergies    Family History   Problem Relation Age of Onset    Arthritis Mother     Hypertension Father     Alcohol abuse Father     Hypertension Maternal Grandfather     Alcohol abuse Brother     Drug Abuse Brother     No Known Problems Maternal Grandmother     No Known Problems Paternal Grandmother     No Known Problems Paternal Grandfather      Social History     Tobacco Use    Smoking status: Never Smoker    Smokeless tobacco: Never Used   Substance Use Topics    Alcohol use: Not Currently       Depression Risk Factor Screening:     3 most recent PHQ Screens 12/12/2019   Little interest or pleasure in doing things Not at all   Feeling down, depressed, irritable, or hopeless Not at all   Total Score PHQ 2 0       Alcohol Risk Factor Screening (MALE > 65): Do you average more 1 drink per night or more than 7 drinks a week: No    In the past three months have you have had more than 4 drinks containing alcohol on one occasion: No      Functional Ability and Level of Safety:   Hearing: Hearing is good. Activities of Daily Living: The home contains: no safety equipment. Patient does total self care    Ambulation: with no difficulty    Fall Risk:  Fall Risk Assessment, last 12 mths 6/13/2019   Able to walk? Yes   Fall in past 12 months? Yes       Abuse Screen:  Patient is not abused    Cognitive Screening   Has your family/caregiver stated any concerns about your memory: no      Patient Care Team   Patient Care Team:  Jeevan Avila MD as PCP - General (Internal Medicine)  Jeevan Avila MD as PCP - [facility]    Assessment/Plan   Education and counseling provided:  Are appropriate based on today's review and evaluation    Diagnoses and all orders for this visit:    1. Medicare annual wellness visit, subsequent    2. Essential hypertension    3. Other hyperlipidemia    4. Need for hepatitis C screening test  -     HEPATITIS C AB; Future        Will await lab results. Had colonoscopy last year. I do not have a return date from GI but pt thinks it is 2021. Pt will request Pneumovax 23 vaccine as well. F/u 6 months.     Health Maintenance Due   Topic Date Due    Hepatitis C Screening  1950    Shingrix Vaccine Age 50> (1 of 2) 01/24/2000    FOBT Q1Y Age 50-75  01/24/2000    Pneumococcal 65+ years (2 of 2 - PPSV23) 04/03/2018    Lipid Screen  04/30/2020    Medicare Yearly Exam  06/13/2020       Eliza Bentley is a 79 y.o. male who was evaluated by an audio-video encounter for concerns as above. Patient identification was verified prior to start of the visit. A caregiver was present when appropriate. Due to this being a TeleHealth encounter (During LEV-11 public health emergency), evaluation of the following organ systems was limited: Vitals/Constitutional/EENT/Resp/CV/GI//MS/Neuro/Skin/Heme-Lymph-Imm. Pursuant to the emergency declaration under the [address] [place] [address], 1135 waiver authority and the [employer] Act, this Virtual Visit was conducted, with patient's (and/or legal guardian's) consent, to reduce the patient's risk of exposure to COVID-19 and provide necessary medical care. Services were provided through a synchronous discussion virtually to substitute for in-person clinic visit. I was in the office. The patient was at home.           Sis Suarez MD

## 2020-06-16 NOTE — PATIENT INSTRUCTIONS
Medicare Wellness Visit, Male The best way to live healthy is to have a lifestyle where you eat a well-balanced diet, exercise regularly, limit alcohol use, and quit all forms of tobacco/nicotine, if applicable. Regular preventive services are another way to keep healthy. Preventive services (vaccines, screening tests, monitoring & exams) can help personalize your care plan, which helps you manage your own care. Screening tests can find health problems at the earliest stages, when they are easiest to treat. [facility] follows the current, evidence-based guidelines published by the [facility] ([facility]) when recommending preventive services for our patients. Because we follow these guidelines, sometimes recommendations change over time as research supports it. (For example, a prostate screening blood test is no longer routinely recommended for men with no symptoms). Of course, you and your doctor may decide to screen more often for some diseases, based on your risk and co-morbidities (chronic disease you are already diagnosed with). Preventive services for you include: - Medicare offers their members a free annual wellness visit, which is time for you and your primary care provider to discuss and plan for your preventive service needs. Take advantage of this benefit every year! 
-All adults over age 72 should receive the recommended pneumonia vaccines. Current USPSTF guidelines recommend a series of two vaccines for the best pneumonia protection.  
-All adults should have a flu vaccine yearly and tetanus vaccine every 10 years. 
-All adults age 48 and older should receive the shingles vaccines (series of two vaccines).       
-All adults age 38-68 who are overweight should have a diabetes screening test once every three years.  
-Other screening tests & preventive services for persons with diabetes include: an eye exam to screen for diabetic retinopathy, a kidney function test, a foot exam, and stricter control over your cholesterol.  
-Cardiovascular screening for adults with routine risk involves an electrocardiogram (ECG) at intervals determined by the provider.  
-Colorectal cancer screening should be done for adults age 54-65 with no increased risk factors for colorectal cancer. There are a number of acceptable methods of screening for this type of cancer. Each test has its own benefits and drawbacks. Discuss with your provider what is most appropriate for you during your annual wellness visit. The different tests include: colonoscopy (considered the best screening method), a fecal occult blood test, a fecal DNA test, and sigmoidoscopy. 
-All adults born between [facility] should be screened once for Hepatitis C. 
-An Abdominal Aortic Aneurysm (AAA) Screening is recommended for men age 73-68 who has ever smoked in their lifetime. Here is a list of your current Health Maintenance items (your personalized list of preventive services) with a due date: 
Health Maintenance Due Topic Date Due  
 Hepatitis C Test  1950  Shingles Vaccine (1 of 2) 01/24/2000  Colon Cancer Stool Test  01/24/2000  Pneumococcal Vaccine (2 of 2 - PPSV23) 04/03/2018  Cholesterol Test   04/30/2020 Zannie Cowden Annual Well Visit  06/13/2020

## 2020-06-22 ENCOUNTER — CLINICAL SUPPORT (OUTPATIENT)
Dept: FAMILY MEDICINE CLINIC | Age: 70
End: 2020-06-22

## 2020-06-22 ENCOUNTER — HOSPITAL ENCOUNTER (OUTPATIENT)
Dept: LAB | Age: 70
Discharge: HOME | End: 2020-06-22
Payer: MEDICARE

## 2020-06-22 VITALS — TEMPERATURE: 98.1 F

## 2020-06-22 DIAGNOSIS — Z12.5 PROSTATE CANCER SCREENING: ICD-10-CM

## 2020-06-22 DIAGNOSIS — Z11.59 NEED FOR HEPATITIS C SCREENING TEST: ICD-10-CM

## 2020-06-22 DIAGNOSIS — E78.49 OTHER HYPERLIPIDEMIA: ICD-10-CM

## 2020-06-22 DIAGNOSIS — Z23 ENCOUNTER FOR IMMUNIZATION: ICD-10-CM

## 2020-06-22 LAB
ALBUMIN SERPL-MCNC: 4.3 G/DL (ref 3.4–5)
ALBUMIN/GLOB SERPL: 1.3 {RATIO} (ref 0.8–1.7)
ALP SERPL-CCNC: 40 U/L (ref 45–117)
ALT SERPL-CCNC: 26 U/L (ref 16–61)
ANION GAP SERPL CALC-SCNC: 8 MMOL/L (ref 3–18)
AST SERPL-CCNC: 21 U/L (ref 10–38)
BASOPHILS # BLD: 0 K/UL (ref 0–0.1)
BASOPHILS NFR BLD: 1 % (ref 0–2)
BILIRUB DIRECT SERPL-MCNC: 0.2 MG/DL (ref 0–0.2)
BILIRUB SERPL-MCNC: 0.8 MG/DL (ref 0.2–1)
BUN SERPL-MCNC: 25 MG/DL (ref 7–18)
BUN/CREAT SERPL: 18 (ref 12–20)
CALCIUM SERPL-MCNC: 9.1 MG/DL (ref 8.5–10.1)
CHLORIDE SERPL-SCNC: 103 MMOL/L (ref 100–111)
CHOLEST SERPL-MCNC: 175 MG/DL
CO2 SERPL-SCNC: 27 MMOL/L (ref 21–32)
CREAT SERPL-MCNC: 1.41 MG/DL (ref 0.6–1.3)
DIFFERENTIAL METHOD BLD: ABNORMAL
EOSINOPHIL # BLD: 0.1 K/UL (ref 0–0.4)
EOSINOPHIL NFR BLD: 2 % (ref 0–5)
ERYTHROCYTE [DISTWIDTH] IN BLOOD BY AUTOMATED COUNT: 14 % (ref 11.6–14.5)
GLOBULIN SER CALC-MCNC: 3.3 G/DL (ref 2–4)
GLUCOSE SERPL-MCNC: 91 MG/DL (ref 74–99)
HCT VFR BLD AUTO: 39.8 % (ref 36–48)
HDLC SERPL-MCNC: 49 MG/DL (ref 40–60)
HDLC SERPL: 3.6 {RATIO} (ref 0–5)
HGB BLD-MCNC: 13.4 G/DL (ref 13–16)
LDLC SERPL CALC-MCNC: 96 MG/DL (ref 0–100)
LIPID PROFILE,FLP: ABNORMAL
LYMPHOCYTES # BLD: 2.3 K/UL (ref 0.9–3.6)
LYMPHOCYTES NFR BLD: 40 % (ref 21–52)
MCH RBC QN AUTO: 29.1 PG (ref 24–34)
MCHC RBC AUTO-ENTMCNC: 33.7 G/DL (ref 31–37)
MCV RBC AUTO: 86.5 FL (ref 74–97)
MONOCYTES # BLD: 0.6 K/UL (ref 0.05–1.2)
MONOCYTES NFR BLD: 10 % (ref 3–10)
NEUTS SEG # BLD: 2.8 K/UL (ref 1.8–8)
NEUTS SEG NFR BLD: 47 % (ref 40–73)
PLATELET # BLD AUTO: 329 K/UL (ref 135–420)
PMV BLD AUTO: 10 FL (ref 9.2–11.8)
POTASSIUM SERPL-SCNC: 3.6 MMOL/L (ref 3.5–5.5)
PROT SERPL-MCNC: 7.6 G/DL (ref 6.4–8.2)
PSA SERPL-MCNC: 0 NG/ML (ref 0–4)
RBC # BLD AUTO: 4.6 M/UL (ref 4.7–5.5)
SODIUM SERPL-SCNC: 138 MMOL/L (ref 136–145)
T4 FREE SERPL-MCNC: 1 NG/DL (ref 0.7–1.5)
TRIGL SERPL-MCNC: 150 MG/DL (ref ?–150)
TSH SERPL DL<=0.05 MIU/L-ACNC: 2.14 UIU/ML (ref 0.36–3.74)
VLDLC SERPL CALC-MCNC: 30 MG/DL
WBC # BLD AUTO: 5.8 K/UL (ref 4.6–13.2)

## 2020-06-22 PROCEDURE — 80061 LIPID PANEL: CPT

## 2020-06-22 PROCEDURE — 80076 HEPATIC FUNCTION PANEL: CPT

## 2020-06-22 PROCEDURE — 81003 URINALYSIS AUTO W/O SCOPE: CPT

## 2020-06-22 PROCEDURE — 84439 ASSAY OF FREE THYROXINE: CPT

## 2020-06-22 PROCEDURE — 84443 ASSAY THYROID STIM HORMONE: CPT

## 2020-06-22 PROCEDURE — 86803 HEPATITIS C AB TEST: CPT

## 2020-06-22 PROCEDURE — 85025 COMPLETE CBC W/AUTO DIFF WBC: CPT

## 2020-06-22 PROCEDURE — 84153 ASSAY OF PSA TOTAL: CPT

## 2020-06-22 PROCEDURE — 80048 BASIC METABOLIC PNL TOTAL CA: CPT

## 2020-06-22 PROCEDURE — 36415 COLL VENOUS BLD VENIPUNCTURE: CPT

## 2020-06-22 NOTE — PROGRESS NOTES
Pneumovax 23 Immunization/s administered 6/22/2020 by Estrella Beltran LPN with guardian's consent. Patient tolerated procedure well. No reactions noted.

## 2020-06-23 LAB
APPEARANCE UR: CLEAR
BILIRUB UR QL: NEGATIVE
COLOR UR: YELLOW
GLUCOSE UR STRIP.AUTO-MCNC: NEGATIVE MG/DL
HCV AB SER IA-ACNC: 0.04 INDEX
HCV AB SERPL QL IA: NEGATIVE
HCV COMMENT,HCGAC: NORMAL
HGB UR QL STRIP: NEGATIVE
KETONES UR QL STRIP.AUTO: NEGATIVE MG/DL
LEUKOCYTE ESTERASE UR QL STRIP.AUTO: NEGATIVE
NITRITE UR QL STRIP.AUTO: NEGATIVE
PH UR STRIP: 5 [PH] (ref 5–8)
PROT UR STRIP-MCNC: NEGATIVE MG/DL
SP GR UR REFRACTOMETRY: 1.02 (ref 1–1.03)
UROBILINOGEN UR QL STRIP.AUTO: 0.2 EU/DL (ref 0.2–1)

## 2020-06-24 NOTE — PROGRESS NOTES
Kidneys look a little dry. Increase water intake, should get 50-60 ounces daily. The rest of his labs look great.

## 2021-01-25 DIAGNOSIS — I10 ESSENTIAL HYPERTENSION: ICD-10-CM

## 2021-01-25 RX ORDER — LOSARTAN POTASSIUM 100 MG/1
100 TABLET ORAL DAILY
Qty: 90 TAB | Refills: 0 | Status: SHIPPED | OUTPATIENT
Start: 2021-01-25

## 2021-04-20 DIAGNOSIS — I10 ESSENTIAL HYPERTENSION: ICD-10-CM

## 2021-04-21 RX ORDER — LOSARTAN POTASSIUM 100 MG/1
TABLET ORAL
Qty: 90 TAB | Refills: 0 | OUTPATIENT
Start: 2021-04-21

## 2021-04-21 NOTE — TELEPHONE ENCOUNTER
Courtesy refill already given. Needs to establish care with a provider here. No additional refills until then.

## 2021-04-29 DIAGNOSIS — I10 ESSENTIAL HYPERTENSION: ICD-10-CM

## 2021-04-30 RX ORDER — LOSARTAN POTASSIUM 100 MG/1
TABLET ORAL
Qty: 90 TAB | Refills: 0 | OUTPATIENT
Start: 2021-04-30

## 2021-04-30 NOTE — TELEPHONE ENCOUNTER
Courtesy refill already provided. Needs to establish care with new provider to receive additional refills.

## 2021-05-24 RX ORDER — CHLORTHALIDONE 25 MG/1
TABLET ORAL
Qty: 90 TABLET | Refills: 0 | OUTPATIENT
Start: 2021-05-24

## 2021-05-24 NOTE — TELEPHONE ENCOUNTER
Courtesy refill already provided. Needs to establish care with one of the providers here for additional refills.

## 2021-05-24 NOTE — TELEPHONE ENCOUNTER
Left message on voicemail for patient to call office has he followed Dr David Dash or is he going to establish with another provider at the practice .

## 2021-06-01 ENCOUNTER — TELEPHONE (OUTPATIENT)
Dept: FAMILY MEDICINE CLINIC | Age: 71
End: 2021-06-01

## 2021-06-04 RX ORDER — CHLORTHALIDONE 25 MG/1
TABLET ORAL
Qty: 90 TABLET | Refills: 0 | OUTPATIENT
Start: 2021-06-04

## 2021-06-04 NOTE — TELEPHONE ENCOUNTER
Courtesy refill already provided. Needs to establish care with one of the providers in this office to receive additional refills.

## 2022-02-09 ENCOUNTER — HOSPITAL ENCOUNTER (OUTPATIENT)
Dept: PHYSICAL THERAPY | Age: 72
Discharge: HOME | End: 2022-02-09
Payer: MEDICARE

## 2022-02-09 PROCEDURE — 97162 PT EVAL MOD COMPLEX 30 MIN: CPT

## 2022-02-09 PROCEDURE — 97110 THERAPEUTIC EXERCISES: CPT

## 2022-02-09 NOTE — PROGRESS NOTES
[facility] [address] [place] [address]  [address], [address] ,[address], [address]  Phone: [phone]  Fax: [phone] / [address] [facility]  Patient Name: Selin Cabrera : 1950   Medical   Diagnosis: Pain in right knee [M25.561]  Pain in left knee [M25.562] Treatment Diagnosis: B knee pain   Onset Date: > 1 year ago     Referral Source: Harper Hodgkin, DO Start of Care [facility]): 2022   Prior Hospitalization: See medical history Provider #: [id number]   Prior Level of Function: Bicycling 6mi 3x/week   Comorbidities: arthritis   Medications: Verified on Patient Summary List   The Plan of Care and following information is based on the information from the initial evaluation.   ===========================================================================================  Assessment / key information: Patient is a 67 y.o. male who presents to [facility] with DX of B knee pain secondary to OA. The patient reports chronic HX of B knee pain. He notes inc in pain with stair negotiation, pivoting and sit>stand from lower position. He notes walking tolerance is limited to approx 30 min prior to onset of antalgic gait. He wears neoprene compression knee sleeves 3-4 days/week due to B knee pain and has refrained from LE weight training/resistance exercises when working out. Objective PT examination findings include: Movement Assessment: B knee varus, lacking full knee ext when walking, squat: excessive anterior WS, lacking normal hip hinge  PROM: L knee 4-130°, R knee 3-134° (min c/o flex/ext ERP)  MMT: good quad set with min pain, B hip abd/ER 3+/5, hip ext 4-/5, knee ext 4-/5  MLT: dec B HS length    Palpation: mild crepitus and pain upon B patellar compression tests    A home exercise program was demonstrated and provided to address the above objective and functional deficits.  Patient can benefit from skilled PT interventions to improve ROM, decrease pain, to facilitate performance of ADLs & improve overall functional status.   ===========================================================================================  Eval Complexity: History MEDIUM  Complexity : 1-2 comorbidities / personal factors will impact the outcome/ POC ;  Examination  MEDIUM Complexity : 3 Standardized tests and measures addressing body structure, function, activity limitation and / or participation in recreation ; Presentation LOW Complexity : Stable, uncomplicated ;  Decision Making MEDIUM Complexity : FOTO score of 26-74; Overall Complexity LOW   Problem List: pain affecting function, decrease ROM, decrease strength, edema affecting function, impaired gait/ balance, decrease ADL/ functional abilitiies, decrease activity tolerance, decrease flexibility/ joint mobility and decrease transfer abilities, FOTO score TBA  Treatment Plan may include any combination of the following: Therapeutic exercise, Therapeutic activities, Neuromuscular re-education, Physical agent/modality, Gait/balance training, Manual therapy including dry needling, Aquatic therapy and Patient education  Patient / Family readiness to learn indicated by: asking questions, trying to perform skills and interest  Persons(s) to be included in education: patient (P)  Barriers to Learning/Limitations: no  Measures taken:    Patient Goal (s): \"underline problem and next steps to resolve it\"   Patient self reported health status: excellent  Rehabilitation Potential: good   Short Term Goals: To be accomplished in  1-2  weeks:  1) Patient to be independent and compliant with initial HEP to prevent further disability. 2) Patient to demonstrate normal gait mechanics when walking with no signs of antalgia. 3) Patient will improve knee ROM to 0-120° so ROM is available for stair negotiation and functional mobility.   4) Patient to perform strong quad set and demonstrate no lag during SLR in order to maintain stable TKE when walking.  Long Term Goals: To be accomplished in  3-4  weeks:  1) Patient to be independent & compliant with a progressive, high level HEP in order to maintain gains made in physical therapy. 2) Improve FOTO score to TBA indicating significant functional improvement in order to return to PLOF. 3) Patient will demonstrate good eccentric quad control in lateral step down off 6'' step with no compensation to facilitate normalized gait pattern for stair negotiation. Frequency / Duration:   Patient to be seen  2-3  times per week for 3-4  weeks:  Patient / Caregiver education and instruction: self care, activity modification and exercises    Therapist Signature: Kt Givens, PT, DPT, MTC, CMTPT Date: 4/6/5387   Certification Period: 2/9/22 to 5/7/22 Time: 10:50 AM   ===========================================================================================  I certify that the above Physical Therapy Services are being furnished while the patient is under my care. I agree with the treatment plan and certify that this therapy is necessary. Physician Signature:        Date:       Time:              Quin Guadalupe, DO  Please sign and return to [facility] or you may fax the signed copy to [phone]. Thank you.

## 2022-02-09 NOTE — PROGRESS NOTES
PHYSICAL THERAPY - DAILY TREATMENT NOTE    Patient Name: Nela Alicia        Date: 2022  : 1950   YES Patient  Verified  Visit #:      12  Insurance: Payor: Otilio Floweryariel / Plan: [place] MEDICARE PART A & B / Product Type: Medicare /      In time: 1103 Out time: 1120   Total Treatment Time: 40     BCBS/Medicare Time Tracking (below)   Total Timed Codes (min):  40 1:1 Treatment Time:  40     TREATMENT AREA =  Pain in right knee [M25.561]  Pain in left knee [M25.562]    SUBJECTIVE  Pain Level (on 0 to 10 scale):  2  / 10   Medication Changes/New allergies or changes in medical history, any new surgeries or procedures? NO    If yes, update Summary List   Subjective Functional Status/Changes:  []  No changes reported   The patient reports chronic HX of B knee pain. He notes inc in pain with stair negotiation, pivoting and sit>stand from lower position. He notes walking tolerance is limited to approx 30 min prior to onset of antalgic gait. He wears neoprene compression knee sleeves 3-4 days/week due to B knee pain and has refrained from LE weight training/resistance exercises when working out.        Modalities Rationale:     decrease inflammation, decrease pain and increase tissue extensibility to improve patient's ability to perform ADLs   min [] Estim, type/location:                                     []  att     []  unatt     []  w/US     []  w/ice    []  w/heat    min []  Mechanical Traction: type/lbs                   []  pro   []  sup   []  int   []  cont    []  before manual    []  after manual    min []  Ultrasound, settings/location:      min []  Iontophoresis w/ dexamethasone, location:                                               []  take home patch       []  in clinic    min []  Ice     []  Heat    location/position:     min []  Vasopneumatic Device, press/temp: If using vaso (only need to measure limb vaso being performed on)      pre-treatment girth :       post-treatment girth : measured at (landmark location) :      min []  Other:    [x] Skin assessment post-treatment (if applicable):    [x]  intact    [x]  redness- no adverse reaction     []redness  adverse reaction:        15 min Therapeutic Exercise:  [x]  See flow sheet   Rationale:      increase ROM and increase strength to improve the patients ability to perform unlimited ADLs        min Therapeutic Activity: [x]  See flow sheet   Rationale:    increase ROM, increase strength, and improve coordination to improve the patients ability to squat and negotiate stairs      Billed With/As:   [x] TE   [] TA   [] Neuro   [] Self Care Patient Education: [x] Review HEP    [] Progressed/Changed HEP based on:   [] positioning   [] body mechanics   [] transfers   [] heat/ice application    [x] other: Issued written HEP and reviewed     Other Objective/Functional Measures: Movement Assessment: B knee varus, lacking full knee ext when walking, squat: excessive anterior WS, lacking normal hip hinge  PROM: L knee 4-130°, R knee 3-134° (min c/o flex/ext ERP)  MMT: good quad set with min pain, B hip abd/ER 3+/5, hip ext 4-/5, knee ext 4-/5  MLT: dec B HS length    Palpation: mild crepitus and pain upon B patellar compression tests     Post Treatment Pain Level (on 0 to 10) scale:   2  / 10     ASSESSMENT  Assessment/Changes in Function:     See POC     []  See Progress Note/Recertification   Patient will continue to benefit from skilled PT services to modify and progress therapeutic interventions, address functional mobility deficits, address ROM deficits, address strength deficits, analyze and address soft tissue restrictions, analyze and cue movement patterns, analyze and modify body mechanics/ergonomics, assess and modify postural abnormalities, address imbalance/dizziness and instruct in home and community integration to attain remaining goals.    Progress toward goals / Updated goals:    Progressing towards newly established goals in POC PLAN  [x]  Upgrade activities as tolerated YES Continue plan of care   []  Discharge due to :    []  Other:      Therapist: Debora Bains, PT, DPT, MTC, CMTPT    Date: 2/9/2022 Time: 10:43 AM     No future appointments.

## 2022-02-17 ENCOUNTER — HOSPITAL ENCOUNTER (OUTPATIENT)
Dept: PHYSICAL THERAPY | Age: 72
Discharge: HOME | End: 2022-02-17
Payer: MEDICARE

## 2022-02-17 PROCEDURE — 97110 THERAPEUTIC EXERCISES: CPT

## 2022-02-17 PROCEDURE — 97530 THERAPEUTIC ACTIVITIES: CPT

## 2022-02-17 NOTE — PROGRESS NOTES
PHYSICAL THERAPY - DAILY TREATMENT NOTE    Patient Name: Cece De Jesus        Date: 2022  : 1950   YES Patient  Verified  Visit #:   2   of   12  Insurance: Payor: Rolando Burrows / Plan: [place] MEDICARE PART A & B / Product Type: Medicare /      In time: 505 Out time: 550   Total Treatment Time: 45     BCBS/Medicare Time Tracking (below)   Total Timed Codes (min):  45 1:1 Treatment Time:  45     TREATMENT AREA =  Pain in right knee [M25.561]  Pain in left knee [M25.562]    SUBJECTIVE  Pain Level (on 0 to 10 scale):  1  / 10   Medication Changes/New allergies or changes in medical history, any new surgeries or procedures? NO    If yes, update Summary List   Subjective Functional Status/Changes:  []  No changes reported     I don't have much pain but they are very stiff. Modalities Rationale:     decrease edema, decrease inflammation and decrease pain to improve patient's ability to perform pain-free ADLs.     min [] Estim, type/location:                                      []  att     []  unatt     []  w/US     []  w/ice    []  w/heat    min []  Mechanical Traction: type/lbs                   []  pro   []  sup   []  int   []  cont    []  before manual    []  after manual    min []  Ultrasound, settings/location:      min []  Iontophoresis w/ dexamethasone, location:                                               []  take home patch       []  in clinic   PD min []  Ice     []  Heat    location/position:     min []  Vasopneumatic Device, press/temp:    If using vaso (only need to measure limb vaso being performed on)      pre-treatment girth :       post-treatment girth :       measured at (landmark location) :      min []  Other:    [] Skin assessment post-treatment (if applicable):    []  intact    []  redness- no adverse reaction                  []redness  adverse reaction:        30 min Therapeutic Exercise:  [x]  See flow sheet   Rationale:      increase ROM, increase strength, improve coordination and improve balance to improve the patients ability to perform unlimited ADLs. 15 min Therapeutic Activity: [x]  See flow sheet   Rationale:    increase ROM, increase strength, improve coordination and improve balance to improve the patients ability to negotiate stairs and curbs with improved stability    Billed With/As:   [x] TE   [] TA   [] Neuro   [] Self Care Patient Education: [x] Review HEP    [] Progressed/Changed HEP based on:   [] positioning   [] body mechanics   [] transfers   [] heat/ice application    [] other:      Other Objective/Functional Measures:    Initiated TE per FS- inc difficulty with quad activation on L compared to R    Issued YTB for home use   Post Treatment Pain Level (on 0 to 10) scale:   1  / 10     ASSESSMENT  Assessment/Changes in Function:     Pt required max cues throughout session to ensure appropriate form with all exercises and improve quad activation with all standing exercises. Cues for posterior weight shift during mini squats; was able to perform through minimal range with correct mechanics before compensating by weight bearing through lateral aspect of foot. Improved with practice but required cues 100% of the time. []  See Progress Note/Recertification   Patient will continue to benefit from skilled PT services to modify and progress therapeutic interventions, address functional mobility deficits, address ROM deficits, address strength deficits, analyze and address soft tissue restrictions, analyze and cue movement patterns, analyze and modify body mechanics/ergonomics and assess and modify postural abnormalities to attain remaining goals. Progress toward goals / Updated goals:    Progressing towards STG 2.       PLAN  []  Upgrade activities as tolerated YES Continue plan of care   []  Discharge due to :    []  Other:      Therapist: Shruti Rubio DPT    Date: 2/17/2022 Time: 6:56 PM     Future Appointments   Date Time Provider Department [place]   2/21/2022  4:30 PM Lara Joseph, PT [facility]   2/23/2022 12:00 PM Lara Joseph, PT [facility]   2/28/2022  3:00 PM Lara Joseph, PT [facility]   3/2/2022 11:15 AM Lara Joseph, PT [facility]

## 2022-02-21 ENCOUNTER — HOSPITAL ENCOUNTER (OUTPATIENT)
Dept: PHYSICAL THERAPY | Age: 72
Discharge: HOME | End: 2022-02-21
Payer: MEDICARE

## 2022-02-21 PROCEDURE — 97530 THERAPEUTIC ACTIVITIES: CPT

## 2022-02-21 PROCEDURE — 97110 THERAPEUTIC EXERCISES: CPT

## 2022-02-21 NOTE — PROGRESS NOTES
PHYSICAL THERAPY - DAILY TREATMENT NOTE    Patient Name: Corky Rodríguez        Date: 2022  : 1950   YES Patient  Verified  Visit #:   3   of   12  Insurance: Payor: Betito Garcia / Plan: [place] MEDICARE PART A & B / Product Type: Medicare /      In time: 430 Out time: 520   Total Treatment Time: 45     BCBS/Medicare Time Tracking (below)   Total Timed Codes (min):  45 1:1 Treatment Time:  40     TREATMENT AREA =  Pain in right knee [M25.561]  Pain in left knee [M25.562]    SUBJECTIVE  Pain Level (on 0 to 10 scale):  2  / 10   Medication Changes/New allergies or changes in medical history, any new surgeries or procedures?     NO    If yes, update Summary List   Subjective Functional Status/Changes:  []  No changes reported     Chyna James been busy today so my legs are tired       Modalities Rationale:     decrease inflammation, decrease pain and increase tissue extensibility to improve patient's ability to perform ADLs   min [] Estim, type/location:                                     []  att     []  unatt     []  w/US     []  w/ice    []  w/heat    min []  Mechanical Traction: type/lbs                   []  pro   []  sup   []  int   []  cont    []  before manual    []  after manual    min []  Ultrasound, settings/location:      min []  Iontophoresis w/ dexamethasone, location:                                               []  take home patch       []  in clinic   PD min []  Ice     []  Heat    location/position:     min []  Vasopneumatic Device, press/temp: If using vaso (only need to measure limb vaso being performed on)      pre-treatment girth :       post-treatment girth :       measured at (landmark location) :      min []  Other:    [x] Skin assessment post-treatment (if applicable):    [x]  intact    [x]  redness- no adverse reaction     []redness  adverse reaction:        30/25 min Therapeutic Exercise:  [x]  See flow sheet   Rationale:      increase ROM and increase strength to improve the patients ability to perform unlimited ADLs       15 min Therapeutic Activity: [x]  See flow sheet   Rationale:    increase ROM, increase strength, and improve coordination to improve the patients ability to squat and negotiate stairs      Billed With/As:   [x] TE   [x] TA   [] Neuro   [] Self Care Patient Education: [x] Review HEP    [] Progressed/Changed HEP based on:   [] positioning   [] body mechanics   [] transfers   [] heat/ice application    [x] other:      Other Objective/Functional Measures:    See FS, added SAQ and step ups   Post Treatment Pain Level (on 0 to 10) scale:   1  / 10     ASSESSMENT  Assessment/Changes in Function:     Cueing needed for isolated recruitment of quad during quad set and to avoid excessive engagement of all other mm. Cueing needed for correct mechanics when performing step ups and full knee ext when needed during all WBing exercises     []  See Progress Note/Recertification   Patient will continue to benefit from skilled PT services to modify and progress therapeutic interventions, address functional mobility deficits, address ROM deficits, address strength deficits, analyze and address soft tissue restrictions, analyze and cue movement patterns, analyze and modify body mechanics/ergonomics, assess and modify postural abnormalities, address imbalance/dizziness and instruct in home and community integration to attain remaining goals.    Progress toward goals / Updated goals:    Progressing towards STG1, requires cueing for proper execution     PLAN  [x]  Upgrade activities as tolerated YES Continue plan of care   []  Discharge due to :    []  Other:      Therapist: Miriam Jackson PT, DPT, MTC, CMTPT    Date: 2/21/2022 Time: 10:43 AM     Future Appointments   Date Time Provider Taapn Weber   2/23/2022 12:00 PM Patrick Buckley PT [facility]   2/28/2022  3:00 PM Patrick Buckley PT [facility]   3/2/2022 11:15 AM [facility]

## 2022-02-23 ENCOUNTER — HOSPITAL ENCOUNTER (OUTPATIENT)
Dept: PHYSICAL THERAPY | Age: 72
Discharge: HOME | End: 2022-02-23
Payer: MEDICARE

## 2022-02-23 PROCEDURE — 97530 THERAPEUTIC ACTIVITIES: CPT

## 2022-02-23 PROCEDURE — 97110 THERAPEUTIC EXERCISES: CPT

## 2022-02-23 NOTE — PROGRESS NOTES
PHYSICAL THERAPY - DAILY TREATMENT NOTE    Patient Name: Minh Villa        Date: 2022  : 1950   YES Patient  Verified  Visit #:      12  Insurance: Payor: Yeimi Linda / Plan: [place] MEDICARE PART A & B / Product Type: Medicare /      In time:  Out time: 100   Total Treatment Time: 50     BCBS/Medicare Time Tracking (below)   Total Timed Codes (min):  40 1:1 Treatment Time:  40     TREATMENT AREA =  Pain in right knee [M25.561]  Pain in left knee [M25.562]    SUBJECTIVE  Pain Level (on 0 to 10 scale):  0  / 10   Medication Changes/New allergies or changes in medical history, any new surgeries or procedures? NO    If yes, update Summary List   Subjective Functional Status/Changes:  []  No changes reported     My knees were sore after last visit but I was in more pain when I got to PT from being busy and riding the bike a lot.  I have less pain straightening my knees now       Modalities Rationale:     decrease inflammation, decrease pain and increase tissue extensibility to improve patient's ability to perform ADLs   min [] Estim, type/location:                                     []  att     []  unatt     []  w/US     []  w/ice    []  w/heat    min []  Mechanical Traction: type/lbs                   []  pro   []  sup   []  int   []  cont    []  before manual    []  after manual    min []  Ultrasound, settings/location:      min []  Iontophoresis w/ dexamethasone, location:                                               []  take home patch       []  in clinic   10 min [x]  Ice     []  Heat    location/position: B knee in supine    min []  Vasopneumatic Device, press/temp: If using vaso (only need to measure limb vaso being performed on)      pre-treatment girth :       post-treatment girth :       measured at (landmark location) :      min []  Other:    [x] Skin assessment post-treatment (if applicable):    [x]  intact    [x]  redness- no adverse reaction     []redness  adverse reaction: 25 min Therapeutic Exercise:  [x]  See flow sheet   Rationale:      increase ROM and increase strength to improve the patients ability to perform unlimited ADLs       15 min Therapeutic Activity: [x]  See flow sheet   Rationale:    increase ROM, increase strength, and improve coordination to improve the patients ability to squat and negotiate stairs      Billed With/As:   [x] TE   [x] TA   [] Neuro   [] Self Care Patient Education: [x] Review HEP    [] Progressed/Changed HEP based on:   [] positioning   [] body mechanics   [] transfers   [] heat/ice application    [x] other:      Other Objective/Functional Measures:    See FS, resumed minisquat   Post Treatment Pain Level (on 0 to 10) scale:   0  / 10     ASSESSMENT  Assessment/Changes in Function:     Fewer cues needed for full knee ext in available ROM and improved tolerance to gastroc stretch and QS. Cueing for hip hinge during squat. Improved stability with step ups     []  See Progress Note/Recertification   Patient will continue to benefit from skilled PT services to modify and progress therapeutic interventions, address functional mobility deficits, address ROM deficits, address strength deficits, analyze and address soft tissue restrictions, analyze and cue movement patterns, analyze and modify body mechanics/ergonomics, assess and modify postural abnormalities, address imbalance/dizziness and instruct in home and community integration to attain remaining goals.    Progress toward goals / Updated goals:    Met STG1     PLAN  [x]  Upgrade activities as tolerated YES Continue plan of care   []  Discharge due to :    []  Other:      Therapist: Imani Coffey, PT, DPT, MTC, CMTPT    Date: 2/23/2022 Time: 10:43 AM     Future Appointments   Date Time Provider Tapan Weber   2/28/2022  3:00 PM Ofelia Maynard PT [facility]   3/2/2022 11:15 AM [facility]

## 2022-02-28 ENCOUNTER — HOSPITAL ENCOUNTER (OUTPATIENT)
Dept: PHYSICAL THERAPY | Age: 72
Discharge: HOME | End: 2022-02-28
Payer: MEDICARE

## 2022-02-28 PROCEDURE — 97110 THERAPEUTIC EXERCISES: CPT

## 2022-02-28 PROCEDURE — 97530 THERAPEUTIC ACTIVITIES: CPT

## 2022-02-28 NOTE — PROGRESS NOTES
PHYSICAL THERAPY - DAILY TREATMENT NOTE    Patient Name: Zully Arora        Date: 2022  : 1950   YES Patient  Verified  Visit #:      12  Insurance: Payor: Madelin Finnegan / Plan: [place] / Product Type: Medicare /      In time: 300 Out time: 350   Total Treatment Time: 50     BCBS/Medicare Time Tracking (below)   Total Timed Codes (min):  50 1:1 Treatment Time:  45     TREATMENT AREA =  Pain in right knee [M25.561]  Pain in left knee [M25.562]    SUBJECTIVE  Pain Level (on 0 to 10 scale):  0  / 10   Medication Changes/New allergies or changes in medical history, any new surgeries or procedures? NO    If yes, update Summary List   Subjective Functional Status/Changes:  []  No changes reported     I go down the stairs slightly sideways and use the railing on the R side a lot.         Modalities Rationale:     decrease inflammation, decrease pain and increase tissue extensibility to improve patient's ability to perform ADLs   min [] Estim, type/location:                                     []  att     []  unatt     []  w/US     []  w/ice    []  w/heat    min []  Mechanical Traction: type/lbs                   []  pro   []  sup   []  int   []  cont    []  before manual    []  after manual    min []  Ultrasound, settings/location:      min []  Iontophoresis w/ dexamethasone, location:                                               []  take home patch       []  in clinic   PD min [x]  Ice     []  Heat    location/position:     min []  Vasopneumatic Device, press/temp: If using vaso (only need to measure limb vaso being performed on)      pre-treatment girth :       post-treatment girth :       measured at (landmark location) :      min []  Other:    [x] Skin assessment post-treatment (if applicable):    [x]  intact    [x]  redness- no adverse reaction     []redness  adverse reaction:        30/25 min Therapeutic Exercise:  [x]  See flow sheet   Rationale:      increase ROM and increase strength to improve the patients ability to perform unlimited ADLs       20 min Therapeutic Activity: [x]  See flow sheet   Rationale:    increase ROM, increase strength, and improve coordination to improve the patients ability to squat and negotiate stairs      Billed With/As:   [x] TE   [x] TA   [] Neuro   [] Self Care Patient Education: [x] Review HEP    [] Progressed/Changed HEP based on:   [] positioning   [] body mechanics   [] transfers   [] heat/ice application    [x] other:      Other Objective/Functional Measures:    See FS, added lat step down and stair negotiation   Post Treatment Pain Level (on 0 to 10) scale:   0  / 10     ASSESSMENT  Assessment/Changes in Function:     Poor proprioception during lat step down, max cueing for , posterior weight shift, hip hinge and smooth eccentric control. Similar cueing needed for minisquats. []  See Progress Note/Recertification   Patient will continue to benefit from skilled PT services to modify and progress therapeutic interventions, address functional mobility deficits, address ROM deficits, address strength deficits, analyze and address soft tissue restrictions, analyze and cue movement patterns, analyze and modify body mechanics/ergonomics, assess and modify postural abnormalities, address imbalance/dizziness and instruct in home and community integration to attain remaining goals.    Progress toward goals / Updated goals:    Progressing towards LTG1, cont to require cuing for proper execution/mechanics     PLAN  [x]  Upgrade activities as tolerated YES Continue plan of care   []  Discharge due to :    []  Other:      Therapist: Esthela Cody PT, DPT, MTC, CMTPT    Date: 2/28/2022 Time: 10:43 AM     Future Appointments   Date Time Provider Tapan Weber   3/2/2022 11:15 AM Priscilla Watters PT [facility]

## 2022-03-02 ENCOUNTER — HOSPITAL ENCOUNTER (OUTPATIENT)
Dept: PHYSICAL THERAPY | Age: 72
Discharge: HOME | End: 2022-03-02
Payer: MEDICARE

## 2022-03-02 PROCEDURE — 97530 THERAPEUTIC ACTIVITIES: CPT

## 2022-03-02 PROCEDURE — 97110 THERAPEUTIC EXERCISES: CPT

## 2022-03-03 NOTE — PROGRESS NOTES
PHYSICAL THERAPY - DAILY TREATMENT NOTE    Patient Name: Miles Askew        Date: 3/2/2022  : 1950   YES Patient  Verified  Visit #:      of   12  Insurance: Payor: Enoc Johns / Plan: [place] MED[place] PART A & B / Product Type: Medicare /      In time: 300 Out time: 350   Total Treatment Time: 50     BCBS/Medicare Time Tracking (below)   Total Timed Codes (min):  50 1:1 Treatment Time:  45     TREATMENT AREA =  Pain in right knee [M25.561]  Pain in left knee [M25.562]    SUBJECTIVE  Pain Level (on 0 to 10 scale):  0  / 10   Medication Changes/New allergies or changes in medical history, any new surgeries or procedures? NO    If yes, update Summary List   Subjective Functional Status/Changes:  []  No changes reported     I go down the stairs slightly sideways and use the railing on the R side a lot.         Modalities Rationale:     decrease inflammation, decrease pain and increase tissue extensibility to improve patient's ability to perform ADLs   min [] Estim, type/location:                                     []  att     []  unatt     []  w/US     []  w/ice    []  w/heat    min []  Mechanical Traction: type/lbs                   []  pro   []  sup   []  int   []  cont    []  before manual    []  after manual    min []  Ultrasound, settings/location:      min []  Iontophoresis w/ dexamethasone, location:                                               []  take home patch       []  in clinic   PD min [x]  Ice     []  Heat    location/position:     min []  Vasopneumatic Device, press/temp: If using vaso (only need to measure limb vaso being performed on)      pre-treatment girth :       post-treatment girth :       measured at (landmark location) :      min []  Other:    [x] Skin assessment post-treatment (if applicable):    [x]  intact    [x]  redness- no adverse reaction     []redness  adverse reaction:        30/25 min Therapeutic Exercise:  [x]  See flow sheet   Rationale:      increase ROM and increase strength to improve the patients ability to perform unlimited ADLs       20 min Therapeutic Activity: [x]  See flow sheet   Rationale:    increase ROM, increase strength, and improve coordination to improve the patients ability to squat and negotiate stairs      Billed With/As:   [x] TE   [x] TA   [] Neuro   [] Self Care Patient Education: [x] Review HEP    [] Progressed/Changed HEP based on:   [] positioning   [] body mechanics   [] transfers   [] heat/ice application    [x] other:      Other Objective/Functional Measures:    See FS, added lat step down and stair negotiation   Post Treatment Pain Level (on 0 to 10) scale:   0  / 10     ASSESSMENT  Assessment/Changes in Function:     Poor proprioception during lat step down, max cueing for , posterior weight shift, hip hinge and smooth eccentric control. Similar cueing needed for minisquats. []  See Progress Note/Recertification   Patient will continue to benefit from skilled PT services to modify and progress therapeutic interventions, address functional mobility deficits, address ROM deficits, address strength deficits, analyze and address soft tissue restrictions, analyze and cue movement patterns, analyze and modify body mechanics/ergonomics, assess and modify postural abnormalities, address imbalance/dizziness and instruct in home and community integration to attain remaining goals. Progress toward goals / Updated goals:    Progressing towards LTG1, cont to require cuing for proper execution/mechanics     PLAN  [x]  Upgrade activities as tolerated YES Continue plan of care   []  Discharge due to :    []  Other:      Therapist: Wen Gomez, PT, DPT, MTC, CMTPT    Date: 3/2/2022 Time: 10:43 AM     No future appointments.

## 2024-01-25 NOTE — PROGRESS NOTES
Aleshia Meyer is a 71 y.o. male (: 1950) presenting to address:    Chief Complaint   Patient presents with    Annual Wellness Visit       Vitals:    19 0831   BP: 132/90   Pulse: 63   Resp: 16   Temp: 98.5 °F (36.9 °C)   TempSrc: Oral   SpO2: 98%   Weight: 201 lb (91.2 kg)   Height: 5' 10\" (1.778 m)   PainSc:   0 - No pain       Hearing/Vision:      Visual Acuity Screening    Right eye Left eye Both eyes   Without correction: 20/50 20/30 20/25   With correction:          Learning Assessment:     Learning Assessment 2019   PRIMARY LEARNER Patient   HIGHEST LEVEL OF EDUCATION - PRIMARY LEARNER  > 4 YEARS OF COLLEGE   BARRIERS PRIMARY LEARNER NONE   PRIMARY LANGUAGE ENGLISH   LEARNER PREFERENCE PRIMARY LISTENING   ANSWERED BY patient    RELATIONSHIP SELF     Depression Screening:     3 most recent PHQ Screens 2019   Little interest or pleasure in doing things Not at all   Feeling down, depressed, irritable, or hopeless Not at all   Total Score PHQ 2 0     Fall Risk Assessment:     Fall Risk Assessment, last 12 mths 2019   Able to walk? Yes   Fall in past 12 months? Yes     Abuse Screening:     Abuse Screening Questionnaire 2019   Do you ever feel afraid of your partner? N   Are you in a relationship with someone who physically or mentally threatens you? N   Is it safe for you to go home? Y     Coordination of Care Questionaire:   1. Have you been to the ER, urgent care clinic since your last visit? Hospitalized since your last visit? NO    2. Have you seen or consulted any other health care providers outside of the [address] [place] [address] since your last visit? Include any pap smears or colon screening. NO    Advanced Directive:   1. Do you have an Advanced Directive? NO    2. Would you like information on Advanced Directives?  NO Yes